# Patient Record
Sex: FEMALE | Race: WHITE | Employment: FULL TIME | URBAN - METROPOLITAN AREA
[De-identification: names, ages, dates, MRNs, and addresses within clinical notes are randomized per-mention and may not be internally consistent; named-entity substitution may affect disease eponyms.]

---

## 2019-09-09 RX ORDER — PAROXETINE HYDROCHLORIDE 20 MG/1
TABLET, FILM COATED ORAL
Qty: 30 TABLET | Refills: 0 | Status: SHIPPED | OUTPATIENT
Start: 2019-09-09 | End: 2019-09-23 | Stop reason: SDUPTHER

## 2019-09-09 RX ORDER — PAROXETINE 10 MG/1
TABLET, FILM COATED ORAL
Qty: 30 TABLET | Refills: 0 | Status: SHIPPED | OUTPATIENT
Start: 2019-09-09 | End: 2019-09-23 | Stop reason: SDUPTHER

## 2019-09-09 RX ORDER — TRAZODONE HYDROCHLORIDE 50 MG/1
TABLET ORAL
Qty: 30 TABLET | Refills: 0 | Status: SHIPPED | OUTPATIENT
Start: 2019-09-09 | End: 2019-09-23 | Stop reason: SDUPTHER

## 2019-09-11 DIAGNOSIS — F41.9 ANXIETY: ICD-10-CM

## 2019-09-11 DIAGNOSIS — E66.09 EXOGENOUS OBESITY: ICD-10-CM

## 2019-09-12 ENCOUNTER — TELEPHONE (OUTPATIENT)
Dept: FAMILY MEDICINE CLINIC | Age: 39
End: 2019-09-12

## 2019-09-23 ENCOUNTER — OFFICE VISIT (OUTPATIENT)
Dept: FAMILY MEDICINE CLINIC | Age: 39
End: 2019-09-23
Payer: COMMERCIAL

## 2019-09-23 VITALS
SYSTOLIC BLOOD PRESSURE: 124 MMHG | WEIGHT: 257 LBS | HEIGHT: 64 IN | OXYGEN SATURATION: 97 % | DIASTOLIC BLOOD PRESSURE: 74 MMHG | BODY MASS INDEX: 43.87 KG/M2 | HEART RATE: 88 BPM

## 2019-09-23 DIAGNOSIS — G47.00 INSOMNIA, UNSPECIFIED TYPE: Primary | ICD-10-CM

## 2019-09-23 DIAGNOSIS — Z23 NEED FOR PROPHYLACTIC VACCINATION AGAINST DIPHTHERIA-TETANUS-PERTUSSIS (DTP): ICD-10-CM

## 2019-09-23 DIAGNOSIS — F41.9 ANXIETY: ICD-10-CM

## 2019-09-23 DIAGNOSIS — E78.2 MIXED HYPERLIPIDEMIA: ICD-10-CM

## 2019-09-23 LAB
A/G RATIO: 1.2 (ref 1.1–2.2)
ALBUMIN SERPL-MCNC: 3.8 G/DL (ref 3.4–5)
ALP BLD-CCNC: 52 U/L (ref 40–129)
ALT SERPL-CCNC: 12 U/L (ref 10–40)
ANION GAP SERPL CALCULATED.3IONS-SCNC: 14 MMOL/L (ref 3–16)
AST SERPL-CCNC: 12 U/L (ref 15–37)
BASOPHILS ABSOLUTE: 0 K/UL (ref 0–0.2)
BASOPHILS RELATIVE PERCENT: 0.3 %
BILIRUB SERPL-MCNC: 0.4 MG/DL (ref 0–1)
BUN BLDV-MCNC: 10 MG/DL (ref 7–20)
CALCIUM SERPL-MCNC: 9 MG/DL (ref 8.3–10.6)
CHLORIDE BLD-SCNC: 101 MMOL/L (ref 99–110)
CHOLESTEROL, TOTAL: 205 MG/DL (ref 0–199)
CO2: 23 MMOL/L (ref 21–32)
CREAT SERPL-MCNC: 0.8 MG/DL (ref 0.6–1.1)
EOSINOPHILS ABSOLUTE: 0.1 K/UL (ref 0–0.6)
EOSINOPHILS RELATIVE PERCENT: 0.7 %
GFR AFRICAN AMERICAN: >60
GFR NON-AFRICAN AMERICAN: >60
GLOBULIN: 3.1 G/DL
GLUCOSE BLD-MCNC: 96 MG/DL (ref 70–99)
HCT VFR BLD CALC: 41.3 % (ref 36–48)
HDLC SERPL-MCNC: 89 MG/DL (ref 40–60)
HEMOGLOBIN: 13.7 G/DL (ref 12–16)
LDL CHOLESTEROL CALCULATED: 75 MG/DL
LYMPHOCYTES ABSOLUTE: 4 K/UL (ref 1–5.1)
LYMPHOCYTES RELATIVE PERCENT: 45.3 %
MCH RBC QN AUTO: 29.3 PG (ref 26–34)
MCHC RBC AUTO-ENTMCNC: 33.2 G/DL (ref 31–36)
MCV RBC AUTO: 88.2 FL (ref 80–100)
MONOCYTES ABSOLUTE: 0.6 K/UL (ref 0–1.3)
MONOCYTES RELATIVE PERCENT: 6.9 %
NEUTROPHILS ABSOLUTE: 4.1 K/UL (ref 1.7–7.7)
NEUTROPHILS RELATIVE PERCENT: 46.8 %
PDW BLD-RTO: 13.8 % (ref 12.4–15.4)
PLATELET # BLD: 360 K/UL (ref 135–450)
PMV BLD AUTO: 7.4 FL (ref 5–10.5)
POTASSIUM SERPL-SCNC: 4.1 MMOL/L (ref 3.5–5.1)
RBC # BLD: 4.69 M/UL (ref 4–5.2)
SODIUM BLD-SCNC: 138 MMOL/L (ref 136–145)
TOTAL PROTEIN: 6.9 G/DL (ref 6.4–8.2)
TRIGL SERPL-MCNC: 205 MG/DL (ref 0–150)
VLDLC SERPL CALC-MCNC: 41 MG/DL
WBC # BLD: 8.8 K/UL (ref 4–11)

## 2019-09-23 PROCEDURE — 99213 OFFICE O/P EST LOW 20 MIN: CPT | Performed by: PHYSICIAN ASSISTANT

## 2019-09-23 PROCEDURE — 90715 TDAP VACCINE 7 YRS/> IM: CPT | Performed by: PHYSICIAN ASSISTANT

## 2019-09-23 PROCEDURE — 90471 IMMUNIZATION ADMIN: CPT | Performed by: PHYSICIAN ASSISTANT

## 2019-09-23 RX ORDER — PAROXETINE 10 MG/1
10 TABLET, FILM COATED ORAL EVERY MORNING
Qty: 90 TABLET | Refills: 1 | Status: SHIPPED | OUTPATIENT
Start: 2019-09-23 | End: 2020-03-23 | Stop reason: SDUPTHER

## 2019-09-23 RX ORDER — PAROXETINE HYDROCHLORIDE 20 MG/1
20 TABLET, FILM COATED ORAL DAILY
Qty: 90 TABLET | Refills: 1 | Status: SHIPPED | OUTPATIENT
Start: 2019-09-23 | End: 2020-03-23 | Stop reason: SDUPTHER

## 2019-09-23 RX ORDER — TRAZODONE HYDROCHLORIDE 50 MG/1
50 TABLET ORAL NIGHTLY
Qty: 90 TABLET | Refills: 1 | Status: SHIPPED | OUTPATIENT
Start: 2019-09-23 | End: 2020-03-23 | Stop reason: SDUPTHER

## 2019-09-23 ASSESSMENT — PATIENT HEALTH QUESTIONNAIRE - PHQ9
2. FEELING DOWN, DEPRESSED OR HOPELESS: 0
SUM OF ALL RESPONSES TO PHQ QUESTIONS 1-9: 1
1. LITTLE INTEREST OR PLEASURE IN DOING THINGS: 1
SUM OF ALL RESPONSES TO PHQ9 QUESTIONS 1 & 2: 1
SUM OF ALL RESPONSES TO PHQ QUESTIONS 1-9: 1

## 2019-09-23 NOTE — PROGRESS NOTES
Smoker    Smokeless tobacco: Never Used   Substance and Sexual Activity    Alcohol use: No    Drug use: No    Sexual activity: Yes   Lifestyle    Physical activity:     Days per week: None     Minutes per session: None    Stress: None   Relationships    Social connections:     Talks on phone: None     Gets together: None     Attends Mandaen service: None     Active member of club or organization: None     Attends meetings of clubs or organizations: None     Relationship status: None    Intimate partner violence:     Fear of current or ex partner: None     Emotionally abused: None     Physically abused: None     Forced sexual activity: None   Other Topics Concern    None   Social History Narrative    None        SURGICAL HISTORY  Past Surgical History:   Procedure Laterality Date    CHOLECYSTECTOMY, LAPAROSCOPIC  2013    Dr Justyna Jarquin, 1993       CURRENT MEDICATIONS  Current Outpatient Medications   Medication Sig Dispense Refill    PARoxetine (PAXIL) 10 MG tablet Take 1 tablet by mouth every morning 90 tablet 1    PARoxetine (PAXIL) 20 MG tablet Take 1 tablet by mouth daily 90 tablet 1    traZODone (DESYREL) 50 MG tablet Take 1 tablet by mouth nightly 90 tablet 1    drospirenone-ethinyl estradiol (QUEENIE 28) 3-0.03 MG TABS Take 1 tablet by mouth daily. No current facility-administered medications for this visit.         ALLERGIES  No Known Allergies    PHYSICAL EXAM    /74 (Site: Left Upper Arm, Position: Sitting, Cuff Size: Large Adult)   Pulse 88   Ht 5' 3.5\" (1.613 m)   Wt 257 lb (116.6 kg)   SpO2 97% Comment: RA  BMI 44.81 kg/m²     Constitutional:  Well developed, well nourished  HENT:  Normocephalic, atraumatic, bilateral external ears normal, oropharynx moist, nose normal  Eyes:  PERRLA, EOMI, conjunctiva normal, no discharge, no scleral icterus  Neck:  Normal range of motion, no tenderness, supple, no thyromegaly   Lymphatic:  No lymphadenopathy care reviewed with patient who verbalizes understanding and wishes to continue. Patient verbalizes understanding with the above plan and is in agreement. Patient will call with  worsening of symptoms, questions or concerns. Please note that this chart was generated using dragon dictation software. Although every effort was made to ensure the accuracy of this automated transcription, some errors in transcription may have occurred.     Electronically signed by Jl Mcgowan PA-C on 9/23/2019

## 2020-03-23 ENCOUNTER — OFFICE VISIT (OUTPATIENT)
Dept: FAMILY MEDICINE CLINIC | Age: 40
End: 2020-03-23
Payer: COMMERCIAL

## 2020-03-23 VITALS
OXYGEN SATURATION: 97 % | WEIGHT: 261 LBS | BODY MASS INDEX: 44.56 KG/M2 | SYSTOLIC BLOOD PRESSURE: 116 MMHG | HEIGHT: 64 IN | HEART RATE: 97 BPM | DIASTOLIC BLOOD PRESSURE: 80 MMHG

## 2020-03-23 PROCEDURE — 99213 OFFICE O/P EST LOW 20 MIN: CPT | Performed by: FAMILY MEDICINE

## 2020-03-23 RX ORDER — PAROXETINE 10 MG/1
10 TABLET, FILM COATED ORAL EVERY MORNING
Qty: 90 TABLET | Refills: 1 | Status: SHIPPED | OUTPATIENT
Start: 2020-03-23 | End: 2020-09-15

## 2020-03-23 RX ORDER — PAROXETINE HYDROCHLORIDE 20 MG/1
20 TABLET, FILM COATED ORAL DAILY
Qty: 90 TABLET | Refills: 1 | Status: SHIPPED | OUTPATIENT
Start: 2020-03-23 | End: 2020-09-15

## 2020-03-23 RX ORDER — TRAZODONE HYDROCHLORIDE 50 MG/1
50 TABLET ORAL NIGHTLY
Qty: 90 TABLET | Refills: 1 | Status: SHIPPED | OUTPATIENT
Start: 2020-03-23 | End: 2020-09-15

## 2020-03-23 ASSESSMENT — ENCOUNTER SYMPTOMS
DIARRHEA: 0
VOMITING: 0
EYE PAIN: 0
ABDOMINAL PAIN: 0
CHEST TIGHTNESS: 0
BLOOD IN STOOL: 0
WHEEZING: 0
NAUSEA: 0
SHORTNESS OF BREATH: 0
TROUBLE SWALLOWING: 0

## 2020-03-23 NOTE — PROGRESS NOTES
3/23/2020    Lor Dash    Chief Complaint   Patient presents with    6 Month Follow-Up     \"at the tail end of sinus infection\"  Thinks might have UTI. HPI  History was obtained from patient. Magdalene Maradiaga is a 46 y.o. female who presents today with routine recheck on anxiety and chronic insomnia. Patient is actually doing quite well. Meds are well-tolerated she is resting okay at night. Patient recently over some allergy symptoms -no history of fever. Mood is actually quite pleasant and nonanxious today. She currently is working from home and so far it is working out well for her. She is giving some symptoms of dysuria, but no blood in no severe pain/fever. Urinalysis today was essentially negative. On review she is due for refills discussion/ encouragement was carried out. REVIEW OF SYMPTOMS    Review of Systems   Constitutional: Negative for activity change and fatigue. HENT: Negative for congestion, hearing loss, mouth sores and trouble swallowing. Eyes: Negative for pain and visual disturbance. Respiratory: Negative for chest tightness, shortness of breath and wheezing. Cardiovascular: Negative for chest pain and palpitations. Gastrointestinal: Negative for abdominal pain, blood in stool, diarrhea, nausea and vomiting. Endocrine: Negative for polydipsia and polyuria. Genitourinary: Negative for dysuria, frequency and urgency. Musculoskeletal: Negative for arthralgias, gait problem and neck stiffness. Skin: Negative for rash. Allergic/Immunologic: Negative for environmental allergies. Neurological: Negative for dizziness, seizures, speech difficulty and weakness. Hematological: Does not bruise/bleed easily. Psychiatric/Behavioral: Positive for sleep disturbance. Negative for agitation, confusion and hallucinations. The patient is nervous/anxious.         PAST MEDICAL HISTORY  Past Medical History:   Diagnosis Date    Anxiety     Chronic low back pain     Take 1 tablet by mouth nightly 90 tablet 1    drospirenone-ethinyl estradiol (QUEENIE 28) 3-0.03 MG TABS Take 1 tablet by mouth daily. No current facility-administered medications for this visit. ALLERGIES  No Known Allergies    PHYSICAL EXAM    /80 (Site: Left Upper Arm, Position: Sitting, Cuff Size: Large Adult)   Pulse 97   Ht 5' 3.5\" (1.613 m)   Wt 261 lb (118.4 kg)   SpO2 97%   BMI 45.51 kg/m²     Physical Exam  Vitals signs and nursing note reviewed. Constitutional:       General: She is not in acute distress. Appearance: She is well-developed. She is obese. She is not ill-appearing. HENT:      Head: Normocephalic and atraumatic. Mouth/Throat:      Mouth: Mucous membranes are moist.   Eyes:      Pupils: Pupils are equal, round, and reactive to light. Neck:      Musculoskeletal: Normal range of motion and neck supple. Cardiovascular:      Rate and Rhythm: Normal rate and regular rhythm. Heart sounds: Normal heart sounds. Pulmonary:      Effort: Pulmonary effort is normal.      Breath sounds: Normal breath sounds. Abdominal:      Palpations: Abdomen is soft. Musculoskeletal: Normal range of motion. Skin:     General: Skin is warm and dry. Neurological:      Mental Status: She is alert and oriented to person, place, and time. Psychiatric:         Thought Content: Thought content normal.         ASSESSMENT & PLAN     Diagnosis Orders   1. Anxiety  PARoxetine (PAXIL) 10 MG tablet    PARoxetine (PAXIL) 20 MG tablet   2. Insomnia, unspecified type  traZODone (DESYREL) 50 MG tablet   3. Dysuria     At this point refills be provided. The patient encouraged for healthy lifestyle. She is to push fluids and avoid caffeine- may use over-the-counter urinary analgesic as needed. Continue to work on exercise and weight loss. Return in about 6 months (around 9/23/2020).          Electronically signed by Mahendra Bear MD on 3/23/2020

## 2020-04-15 ENCOUNTER — TELEPHONE (OUTPATIENT)
Dept: FAMILY MEDICINE CLINIC | Age: 40
End: 2020-04-15

## 2020-04-15 RX ORDER — SULFAMETHOXAZOLE AND TRIMETHOPRIM 800; 160 MG/1; MG/1
1 TABLET ORAL 2 TIMES DAILY
Qty: 10 TABLET | Refills: 0 | Status: SHIPPED | OUTPATIENT
Start: 2020-04-15 | End: 2020-04-20

## 2020-04-15 NOTE — TELEPHONE ENCOUNTER
Have her push fluids avoid caffeine. We will empirically treat her with Bactrim DS 1 p.o. twice daily for 5 days.   May use over-the-counter Azo for symptomatic bladder discomfort

## 2020-04-15 NOTE — TELEPHONE ENCOUNTER
Pt is having frequent need to use the bathroom with very little actually coming out-- was tested for UTI when she was here in March and it was negative-- Is worried about having some sort of infections-- she self treated for yest but is still having the frequency

## 2020-09-15 RX ORDER — PAROXETINE 10 MG/1
TABLET, FILM COATED ORAL
Qty: 30 TABLET | Refills: 0 | Status: SHIPPED | OUTPATIENT
Start: 2020-09-15 | End: 2020-09-28 | Stop reason: SDUPTHER

## 2020-09-15 RX ORDER — PAROXETINE HYDROCHLORIDE 20 MG/1
TABLET, FILM COATED ORAL
Qty: 30 TABLET | Refills: 0 | Status: SHIPPED | OUTPATIENT
Start: 2020-09-15 | End: 2020-09-28 | Stop reason: SDUPTHER

## 2020-09-15 RX ORDER — TRAZODONE HYDROCHLORIDE 50 MG/1
TABLET ORAL
Qty: 30 TABLET | Refills: 0 | Status: SHIPPED | OUTPATIENT
Start: 2020-09-15 | End: 2020-09-28 | Stop reason: SDUPTHER

## 2020-09-15 NOTE — TELEPHONE ENCOUNTER
Requested Prescriptions     Signed Prescriptions Disp Refills    traZODone (DESYREL) 50 MG tablet 30 tablet 0     Sig: TAKE 1 TABLET BY MOUTH EVERY DAY AT NIGHT     Authorizing Provider: Olive Baum     Ordering User: AB Kurtz    PARoxetine (PAXIL) 20 MG tablet 30 tablet 0     Sig: TAKE 1 TABLET BY MOUTH EVERY DAY     Authorizing Provider: Olive Baum     Ordering User: AB PEREZ    PARoxetine (PAXIL) 10 MG tablet 30 tablet 0     Sig: TAKE 1 TABLET BY MOUTH EVERY DAY IN THE MORNING     Authorizing Provider: Olive Baum     Ordering User: Cami Yung

## 2020-09-23 RX ORDER — PAROXETINE HYDROCHLORIDE 20 MG/1
TABLET, FILM COATED ORAL
Qty: 30 TABLET | Refills: 0 | OUTPATIENT
Start: 2020-09-23 | End: 2020-10-23

## 2020-09-23 RX ORDER — PAROXETINE 10 MG/1
TABLET, FILM COATED ORAL
Qty: 30 TABLET | Refills: 0 | OUTPATIENT
Start: 2020-09-23 | End: 2020-10-23

## 2020-09-23 RX ORDER — TRAZODONE HYDROCHLORIDE 50 MG/1
TABLET ORAL
Qty: 30 TABLET | Refills: 0 | OUTPATIENT
Start: 2020-09-23 | End: 2020-10-23

## 2020-09-28 ENCOUNTER — TELEMEDICINE (OUTPATIENT)
Dept: FAMILY MEDICINE CLINIC | Age: 40
End: 2020-09-28
Payer: COMMERCIAL

## 2020-09-28 PROCEDURE — 99213 OFFICE O/P EST LOW 20 MIN: CPT | Performed by: FAMILY MEDICINE

## 2020-09-28 RX ORDER — PAROXETINE 10 MG/1
TABLET, FILM COATED ORAL
Qty: 90 TABLET | Refills: 1 | Status: SHIPPED | OUTPATIENT
Start: 2020-09-28 | End: 2021-03-22

## 2020-09-28 RX ORDER — PAROXETINE HYDROCHLORIDE 20 MG/1
TABLET, FILM COATED ORAL
Qty: 90 TABLET | Refills: 1 | Status: SHIPPED | OUTPATIENT
Start: 2020-09-28 | End: 2021-03-22

## 2020-09-28 RX ORDER — TRAZODONE HYDROCHLORIDE 50 MG/1
TABLET ORAL
Qty: 90 TABLET | Refills: 1 | Status: SHIPPED | OUTPATIENT
Start: 2020-09-28 | End: 2021-03-22

## 2020-09-28 ASSESSMENT — ENCOUNTER SYMPTOMS
TROUBLE SWALLOWING: 0
CHEST TIGHTNESS: 0
SHORTNESS OF BREATH: 0
EYE PAIN: 0
WHEEZING: 0
ABDOMINAL PAIN: 0
BLOOD IN STOOL: 0
DIARRHEA: 0
VOMITING: 0
NAUSEA: 0

## 2020-09-28 ASSESSMENT — PATIENT HEALTH QUESTIONNAIRE - PHQ9
SUM OF ALL RESPONSES TO PHQ QUESTIONS 1-9: 0
1. LITTLE INTEREST OR PLEASURE IN DOING THINGS: 0
SUM OF ALL RESPONSES TO PHQ QUESTIONS 1-9: 0
SUM OF ALL RESPONSES TO PHQ9 QUESTIONS 1 & 2: 0
2. FEELING DOWN, DEPRESSED OR HOPELESS: 0

## 2020-09-28 NOTE — PROGRESS NOTES
2020    TELEHEALTH EVALUATION -- Audio/Visual (During [facility]-48 public health emergency)    HPI:    Abisai Odonnell (:  1980) has requested an audio/video evaluation for the following concern(s): Anxiety and insomnia. Does have ongoing issues with increased weight also BMI 45.5. Patient states she is now working from home and she is going to accept that and actually enjoy it. Does have some other issues with stress from work overall is been doing well the Paxil has done a fairly good job for anxiety attacks and does well does help with sleep. She ran out of her Paxil and had some withdrawal side effects recently. Denies other acute changes or problems labs were about a year ago they were reasonable. Today mood seems good denies other acute issues. Did discuss the fact that she needs to continue to work on increasing exercise and weight loss. On review she will need refills and was advised to get flu shot along with rest of her family. Review of Systems   Constitutional: Negative for activity change and fatigue. HENT: Negative for congestion, hearing loss, mouth sores and trouble swallowing. Eyes: Negative for pain and visual disturbance. Respiratory: Negative for chest tightness, shortness of breath and wheezing. Cardiovascular: Negative for chest pain and palpitations. Gastrointestinal: Negative for abdominal pain, blood in stool, diarrhea, nausea and vomiting. Endocrine: Negative for polydipsia and polyuria. Genitourinary: Negative for dysuria, frequency and urgency. Musculoskeletal: Negative for arthralgias, gait problem and neck stiffness. Skin: Negative for rash. Allergic/Immunologic: Negative for environmental allergies. Neurological: Negative for dizziness, seizures, speech difficulty and weakness. Hematological: Does not bruise/bleed easily. Psychiatric/Behavioral: Positive for sleep disturbance. Negative for agitation, confusion and hallucinations.  The patient is nervous/anxious. Prior to Visit Medications    Medication Sig Taking? Authorizing Provider   PARoxetine (PAXIL) 10 MG tablet TAKE 1 TABLET BY MOUTH EVERY DAY IN THE MORNING Yes Papito Gardner MD   PARoxetine (PAXIL) 20 MG tablet TAKE 1 TABLET BY MOUTH EVERY DAY Yes Papito Gardner MD   traZODone (DESYREL) 50 MG tablet TAKE 1 TABLET BY MOUTH EVERY DAY AT NIGHT Yes Papito Gardner MD   drospirenone-ethinyl estradiol (QUEENIE 28) 3-0.03 MG TABS Take 1 tablet by mouth daily. Yes Historical Provider, MD       Social History     Tobacco Use    Smoking status: Never Smoker    Smokeless tobacco: Never Used   Substance Use Topics    Alcohol use: No    Drug use: No            PHYSICAL EXAMINATION:  [ INSTRUCTIONS:  \"[x]\" Indicates a positive item  \"[]\" Indicates a negative item  -- DELETE ALL ITEMS NOT EXAMINED]  Vital Signs: (As obtained by patient/caregiver or practitioner observation)    Blood pressure-  Heart rate-    Respiratory rate-    Temperature-  Pulse oximetry-     Constitutional: [x] Appears well-developed and well-nourished [x] No apparent distress      [] Abnormal-   Mental status  [x] Alert and awake  [x] Oriented to person/place/time [x]Able to follow commands      Eyes:  EOM    [x]  Normal  [] Abnormal-  Sclera  [x]  Normal  [] Abnormal -         Discharge []  None visible  [] Abnormal -    HENT:   [x] Normocephalic, atraumatic.   [] Abnormal   [x] Mouth/Throat: Mucous membranes are moist.     External Ears [] Normal  [] Abnormal-     Neck: [x] No visualized mass     Pulmonary/Chest: [x] Respiratory effort normal.  [x] No visualized signs of difficulty breathing or respiratory distress        [] Abnormal-      Musculoskeletal:   [] Normal gait with no signs of ataxia         [x] Normal range of motion of neck        [] Abnormal-       Neurological:        [x] No Facial Asymmetry (Cranial nerve 7 motor function) (limited exam to video visit)          [x] No gaze palsy [] Abnormal-         Skin:        [x] No significant exanthematous lesions or discoloration noted on facial skin         [] Abnormal-            Psychiatric:       [x] Normal Affect [] No Hallucinations        [] Abnormal-     Other pertinent observable physical exam findings-     ASSESSMENT/PLAN:  1. Anxiety    - PARoxetine (PAXIL) 10 MG tablet; TAKE 1 TABLET BY MOUTH EVERY DAY IN THE MORNING  Dispense: 90 tablet; Refill: 1  - PARoxetine (PAXIL) 20 MG tablet; TAKE 1 TABLET BY MOUTH EVERY DAY  Dispense: 90 tablet; Refill: 1    2. Insomnia, unspecified type    - traZODone (DESYREL) 50 MG tablet; TAKE 1 TABLET BY MOUTH EVERY DAY AT NIGHT  Dispense: 90 tablet; Refill: 1    3. Exogenous obesity  At this point questions answered encouragement given. Continue with healthy lifestyle, weight loss, exercise, and social distancing. Will provide refills as needed. She was advised to get a flu shot. He is to call with questions or problems. Return in about 6 months (around 3/28/2021). Jewel Li is a 46 y.o. female being evaluated by a Virtual Visit (video visit) encounter to address concerns as mentioned above. A caregiver was present when appropriate. Due to this being a TeleHealth encounter (During [facility] public health emergency), evaluation of the following organ systems was limited: Vitals/Constitutional/EENT/Resp/CV/GI//MS/Neuro/Skin/Heme-Lymph-Imm. Pursuant to the emergency declaration under the [address] [place] [address] and the [employer] and [employer] Act, this Virtual Visit was conducted with patient's (and/or legal guardian's) consent, to reduce the patient's risk of exposure to COVID-19 and provide necessary medical care.   The patient (and/or legal guardian) has also been advised to contact this office for worsening conditions or problems, and seek emergency medical treatment and/or call 911 if deemed necessary. Patient identification was verified at the start of the visit: Yes    Total time spent on this encounter: 15 minutes    Services were provided through a video synchronous discussion virtually to substitute for in-person clinic visit. Patient and provider were located at their individual homes. --Morena Andujar MD on 9/28/2020 at 12:27 PM    An electronic signature was used to authenticate this note.

## 2021-03-21 DIAGNOSIS — F41.9 ANXIETY: ICD-10-CM

## 2021-03-21 DIAGNOSIS — G47.00 INSOMNIA, UNSPECIFIED TYPE: ICD-10-CM

## 2021-03-22 RX ORDER — TRAZODONE HYDROCHLORIDE 50 MG/1
TABLET ORAL
Qty: 90 TABLET | Refills: 1 | Status: SHIPPED | OUTPATIENT
Start: 2021-03-22 | End: 2021-09-16

## 2021-03-22 RX ORDER — PAROXETINE HYDROCHLORIDE 20 MG/1
TABLET, FILM COATED ORAL
Qty: 90 TABLET | Refills: 1 | Status: SHIPPED | OUTPATIENT
Start: 2021-03-22 | End: 2021-09-16

## 2021-03-22 RX ORDER — PAROXETINE 10 MG/1
TABLET, FILM COATED ORAL
Qty: 90 TABLET | Refills: 1 | Status: SHIPPED | OUTPATIENT
Start: 2021-03-22 | End: 2021-09-16

## 2021-03-29 ENCOUNTER — OFFICE VISIT (OUTPATIENT)
Dept: FAMILY MEDICINE CLINIC | Age: 41
End: 2021-03-29
Payer: COMMERCIAL

## 2021-03-29 VITALS
WEIGHT: 256 LBS | BODY MASS INDEX: 43.71 KG/M2 | HEART RATE: 88 BPM | SYSTOLIC BLOOD PRESSURE: 116 MMHG | DIASTOLIC BLOOD PRESSURE: 76 MMHG | TEMPERATURE: 97 F | OXYGEN SATURATION: 97 % | HEIGHT: 64 IN

## 2021-03-29 DIAGNOSIS — Z13.29 THYROID DISORDER SCREEN: ICD-10-CM

## 2021-03-29 DIAGNOSIS — G47.00 INSOMNIA, UNSPECIFIED TYPE: ICD-10-CM

## 2021-03-29 DIAGNOSIS — E78.2 MIXED HYPERLIPIDEMIA: ICD-10-CM

## 2021-03-29 DIAGNOSIS — F41.9 ANXIETY: Primary | ICD-10-CM

## 2021-03-29 LAB
A/G RATIO: 1.1 (ref 1.1–2.2)
ALBUMIN SERPL-MCNC: 3.7 G/DL (ref 3.4–5)
ALP BLD-CCNC: 55 U/L (ref 40–129)
ALT SERPL-CCNC: 8 U/L (ref 10–40)
ANION GAP SERPL CALCULATED.3IONS-SCNC: 12 MMOL/L (ref 3–16)
AST SERPL-CCNC: 10 U/L (ref 15–37)
BILIRUB SERPL-MCNC: 0.3 MG/DL (ref 0–1)
BUN BLDV-MCNC: 9 MG/DL (ref 7–20)
CALCIUM SERPL-MCNC: 9 MG/DL (ref 8.3–10.6)
CHLORIDE BLD-SCNC: 106 MMOL/L (ref 99–110)
CHOLESTEROL, TOTAL: 217 MG/DL (ref 0–199)
CO2: 23 MMOL/L (ref 21–32)
CREAT SERPL-MCNC: 0.8 MG/DL (ref 0.6–1.1)
GFR AFRICAN AMERICAN: >60
GFR NON-AFRICAN AMERICAN: >60
GLOBULIN: 3.3 G/DL
GLUCOSE BLD-MCNC: 84 MG/DL (ref 70–99)
HCT VFR BLD CALC: 39.1 % (ref 36–48)
HDLC SERPL-MCNC: 70 MG/DL (ref 40–60)
HEMOGLOBIN: 13.2 G/DL (ref 12–16)
LDL CHOLESTEROL CALCULATED: 100 MG/DL
MCH RBC QN AUTO: 29.5 PG (ref 26–34)
MCHC RBC AUTO-ENTMCNC: 33.9 G/DL (ref 31–36)
MCV RBC AUTO: 86.9 FL (ref 80–100)
PDW BLD-RTO: 13.6 % (ref 12.4–15.4)
PLATELET # BLD: 359 K/UL (ref 135–450)
PMV BLD AUTO: 7.5 FL (ref 5–10.5)
POTASSIUM SERPL-SCNC: 4.3 MMOL/L (ref 3.5–5.1)
RBC # BLD: 4.49 M/UL (ref 4–5.2)
SODIUM BLD-SCNC: 141 MMOL/L (ref 136–145)
TOTAL PROTEIN: 7 G/DL (ref 6.4–8.2)
TRIGL SERPL-MCNC: 234 MG/DL (ref 0–150)
TSH REFLEX: 1.18 UIU/ML (ref 0.27–4.2)
VLDLC SERPL CALC-MCNC: 47 MG/DL
WBC # BLD: 7.1 K/UL (ref 4–11)

## 2021-03-29 PROCEDURE — 99213 OFFICE O/P EST LOW 20 MIN: CPT | Performed by: FAMILY MEDICINE

## 2021-03-29 ASSESSMENT — PATIENT HEALTH QUESTIONNAIRE - PHQ9
SUM OF ALL RESPONSES TO PHQ QUESTIONS 1-9: 2
SUM OF ALL RESPONSES TO PHQ9 QUESTIONS 1 & 2: 2
1. LITTLE INTEREST OR PLEASURE IN DOING THINGS: 1
2. FEELING DOWN, DEPRESSED OR HOPELESS: 1
SUM OF ALL RESPONSES TO PHQ QUESTIONS 1-9: 2

## 2021-03-29 ASSESSMENT — ENCOUNTER SYMPTOMS
VOMITING: 0
CHEST TIGHTNESS: 0
ABDOMINAL PAIN: 0
EYE PAIN: 0
SHORTNESS OF BREATH: 0
TROUBLE SWALLOWING: 0
DIARRHEA: 0
BLOOD IN STOOL: 0
WHEEZING: 0
NAUSEA: 0

## 2021-03-29 NOTE — PROGRESS NOTES
3/29/2021    Celiasa Dominguez Hardy    Chief Complaint   Patient presents with    6 Month Follow-Up     has an appt tomorrow with ortho for knee, galluch. HPI  History was obtained from the patient. Charl Holstein is a 45 y.o. female who presents today with follow-up on anxiety, insomnia, GERD, and increased weight. She continues to work from home which has been very helpful for her stress levels. Mood remains fairly positive she is took a week off to be with her daughter over spring break. Labs in September 2019 were reasonable she is due for more labs at this point. Has not had Covid vaccine and we discussed the importance of her getting that now that it is down to her age group. Tamar Long REVIEW OF SYMPTOMS    Review of Systems   Constitutional: Negative for activity change and fatigue. HENT: Negative for congestion, hearing loss, mouth sores and trouble swallowing. Eyes: Negative for pain and visual disturbance. Respiratory: Negative for chest tightness, shortness of breath and wheezing. Cardiovascular: Negative for chest pain and palpitations. Gastrointestinal: Negative for abdominal pain, blood in stool, diarrhea, nausea and vomiting. Endocrine: Negative for cold intolerance, polydipsia and polyuria. Genitourinary: Negative for dysuria, frequency and urgency. Musculoskeletal: Negative for arthralgias, gait problem and neck stiffness. Skin: Negative for rash. Allergic/Immunologic: Negative for environmental allergies. Neurological: Negative for dizziness, seizures, speech difficulty and weakness. Hematological: Does not bruise/bleed easily. Psychiatric/Behavioral: Positive for sleep disturbance. Negative for agitation, confusion and hallucinations. The patient is nervous/anxious.         PAST MEDICAL HISTORY  Past Medical History:   Diagnosis Date    Anxiety     Chronic low back pain     Exogenous obesity     Gall stones     GERD (gastroesophageal reflux disease)     Hyperlipidemia  Insomnia     Irritable bowel syndrome (IBS)        FAMILY HISTORY  Family History   Problem Relation Age of Onset    Seizures Mother     Sleep Apnea Mother     Hypertension Father    Collette Satchel Elevated Lipids Father        SOCIAL HISTORY  Social History     Socioeconomic History    Marital status:      Spouse name: None    Number of children: 1    Years of education: None    Highest education level: None   Occupational History    None   Social Needs    Financial resource strain: None    Food insecurity     Worry: None     Inability: None    Transportation needs     Medical: None     Non-medical: None   Tobacco Use    Smoking status: Never Smoker    Smokeless tobacco: Never Used   Substance and Sexual Activity    Alcohol use: No    Drug use: No    Sexual activity: Yes   Lifestyle    Physical activity     Days per week: None     Minutes per session: None    Stress: None   Relationships    Social connections     Talks on phone: None     Gets together: None     Attends Scientologist service: None     Active member of club or organization: None     Attends meetings of clubs or organizations: None     Relationship status: None    Intimate partner violence     Fear of current or ex partner: None     Emotionally abused: None     Physically abused: None     Forced sexual activity: None   Other Topics Concern    None   Social History Narrative    None        SURGICAL HISTORY  Past Surgical History:   Procedure Laterality Date    CHOLECYSTECTOMY, LAPAROSCOPIC  2013    Dr Betsy Heck, 1993                 CURRENT MEDICATIONS  Current Outpatient Medications   Medication Sig Dispense Refill    PARoxetine (PAXIL) 20 MG tablet TAKE 1 TABLET BY MOUTH EVERY DAY 90 tablet 1    PARoxetine (PAXIL) 10 MG tablet TAKE 1 TABLET BY MOUTH EVERY DAY IN THE MORNING 90 tablet 1    traZODone (DESYREL) 50 MG tablet TAKE 1 TABLET BY MOUTH EVERY DAY AT NIGHT 90 tablet 1    drospirenone-ethinyl estradiol (QUEENIE 28) 3-0.03 MG TABS Take 1 tablet by mouth daily. No current facility-administered medications for this visit. ALLERGIES  No Known Allergies    PHYSICAL EXAM    /76 (Site: Right Upper Arm, Position: Sitting, Cuff Size: Large Adult)   Pulse 88   Temp 97 °F (36.1 °C)   Ht 5' 3.5\" (1.613 m)   Wt 256 lb (116.1 kg)   SpO2 97%   BMI 44.64 kg/m²     Physical Exam  Vitals signs and nursing note reviewed. Constitutional:       General: She is not in acute distress. Appearance: She is well-developed. She is obese. She is not ill-appearing or toxic-appearing. HENT:      Head: Normocephalic and atraumatic. Nose: Nose normal.      Mouth/Throat:      Mouth: Mucous membranes are moist.   Eyes:      Pupils: Pupils are equal, round, and reactive to light. Neck:      Musculoskeletal: Normal range of motion and neck supple. Cardiovascular:      Rate and Rhythm: Normal rate and regular rhythm. Heart sounds: Normal heart sounds. No murmur. Pulmonary:      Effort: Pulmonary effort is normal.      Breath sounds: Rhonchi present. No wheezing or rales. Abdominal:      Palpations: Abdomen is soft. Musculoskeletal: Normal range of motion. General: No swelling or deformity. Skin:     General: Skin is warm and dry. Neurological:      General: No focal deficit present. Mental Status: She is alert and oriented to person, place, and time. Mental status is at baseline. Cranial Nerves: No cranial nerve deficit. Gait: Gait normal.   Psychiatric:         Mood and Affect: Mood normal.         Behavior: Behavior normal.         Thought Content: Thought content normal.         ASSESSMENT & PLAN     Diagnosis Orders   1. Anxiety     2. Insomnia, unspecified type  CBC    COMPREHENSIVE METABOLIC PANEL   3. Mixed hyperlipidemia  LIPID PANEL   4. Thyroid disorder screen  TSH with Reflex   Advised to get Covid shot.   And to work on increasing exercise and weight loss.  Will check CBC, CMP, TSH, and lipid panel and have her follow-up for all results. Questions answered and encouragement given. Refills to be provided as needed. Return in about 6 months (around 9/29/2021).          Electronically signed by Tia Recio MD on 3/29/2021

## 2021-09-16 DIAGNOSIS — G47.00 INSOMNIA, UNSPECIFIED TYPE: ICD-10-CM

## 2021-09-16 DIAGNOSIS — F41.9 ANXIETY: ICD-10-CM

## 2021-09-16 RX ORDER — PAROXETINE 10 MG/1
TABLET, FILM COATED ORAL
Qty: 90 TABLET | Refills: 1 | Status: SHIPPED | OUTPATIENT
Start: 2021-09-16 | End: 2022-04-18

## 2021-09-16 RX ORDER — TRAZODONE HYDROCHLORIDE 50 MG/1
TABLET ORAL
Qty: 90 TABLET | Refills: 1 | Status: SHIPPED | OUTPATIENT
Start: 2021-09-16 | End: 2022-03-29 | Stop reason: SDUPTHER

## 2021-09-16 RX ORDER — PAROXETINE HYDROCHLORIDE 20 MG/1
TABLET, FILM COATED ORAL
Qty: 90 TABLET | Refills: 1 | Status: SHIPPED | OUTPATIENT
Start: 2021-09-16 | End: 2022-04-18

## 2021-09-29 ENCOUNTER — TELEMEDICINE (OUTPATIENT)
Dept: FAMILY MEDICINE CLINIC | Age: 41
End: 2021-09-29
Payer: COMMERCIAL

## 2021-09-29 DIAGNOSIS — F41.9 ANXIETY: Primary | ICD-10-CM

## 2021-09-29 DIAGNOSIS — E66.09 EXOGENOUS OBESITY: ICD-10-CM

## 2021-09-29 DIAGNOSIS — G47.00 INSOMNIA, UNSPECIFIED TYPE: ICD-10-CM

## 2021-09-29 PROCEDURE — 99213 OFFICE O/P EST LOW 20 MIN: CPT | Performed by: FAMILY MEDICINE

## 2021-09-29 ASSESSMENT — ENCOUNTER SYMPTOMS
NAUSEA: 0
BACK PAIN: 1
EYE PAIN: 0
BLOOD IN STOOL: 0
DIARRHEA: 0
WHEEZING: 0
TROUBLE SWALLOWING: 0
VOMITING: 0
SHORTNESS OF BREATH: 0
CHEST TIGHTNESS: 0
ABDOMINAL PAIN: 0

## 2021-09-29 NOTE — PROGRESS NOTES
2021    TELEHEALTH EVALUATION -- Audio/Visual (During VLQOI-27 public health emergency)    HPI:    Cathleen Pacheco (:  1980) has requested an audio/video evaluation for the following concern(s): Anxiety, insomnia, low back pain, hyperlipidemia, and increased weight. Patient states she is Maryan Rasheed doing well work stresses been not controlled at this point. Mood is good labs in 2021 were satisfactory these included a CBC, CMP, TSH and lipid panel. Her insomnia is marginal work stress comes and goes but overall she is handling it better. Low back pain is unchanged. Still fighting weight issues. She has had her Covid shots x2. She and her  remain very involved in their daughter's softball career. Her medications are well-tolerated. Review of Systems   Constitutional: Negative for activity change and fatigue. HENT: Negative for congestion, hearing loss, mouth sores and trouble swallowing. Eyes: Negative for pain and visual disturbance. Respiratory: Negative for chest tightness, shortness of breath and wheezing. Cardiovascular: Negative for chest pain and palpitations. Gastrointestinal: Negative for abdominal pain, blood in stool, diarrhea, nausea and vomiting. Endocrine: Negative for polydipsia and polyuria. Genitourinary: Negative for dysuria, frequency and urgency. Musculoskeletal: Positive for back pain. Negative for arthralgias, gait problem and neck stiffness. Skin: Negative for rash. Allergic/Immunologic: Negative for environmental allergies. Neurological: Negative for dizziness, seizures, speech difficulty and weakness. Hematological: Does not bruise/bleed easily. Psychiatric/Behavioral: Positive for sleep disturbance. Negative for agitation, confusion and hallucinations. The patient is nervous/anxious. Prior to Visit Medications    Medication Sig Taking?  Authorizing Provider   PARoxetine (PAXIL) 10 MG tablet TAKE 1 TABLET BY MOUTH EVERY DAY Maru Morin Yes Destiney Amador MD   PARoxetine (PAXIL) 20 MG tablet TAKE 1 TABLET BY MOUTH EVERY DAY Yes Destiney Amador MD   traZODone (DESYREL) 50 MG tablet TAKE 1 TABLET BY MOUTH EVERY DAY AT NIGHT Yes Destiney Amador MD   drospirenone-ethinyl estradiol (QUEENIE 28) 3-0.03 MG TABS Take 1 tablet by mouth daily. Yes Historical Provider, MD       Social History     Tobacco Use    Smoking status: Never Smoker    Smokeless tobacco: Never Used   Substance Use Topics    Alcohol use: No    Drug use: No            PHYSICAL EXAMINATION:  [ INSTRUCTIONS:  \"[x]\" Indicates a positive item  \"[]\" Indicates a negative item  -- DELETE ALL ITEMS NOT EXAMINED]  Vital Signs: (As obtained by patient/caregiver or practitioner observation)    Blood pressure-  Heart rate-    Respiratory rate-    Temperature-  Pulse oximetry-     Constitutional: [x] Appears well-developed and well-nourished [x] No apparent distress      [] Abnormal-   Mental status  [x] Alert and awake  [x] Oriented to person/place/time [x]Able to follow commands      Eyes:  EOM    [x]  Normal  [] Abnormal-  Sclera  [x]  Normal  [] Abnormal -         Discharge []  None visible  [] Abnormal -    HENT:   [x] Normocephalic, atraumatic.   [] Abnormal   [x] Mouth/Throat: Mucous membranes are moist.     External Ears [] Normal  [] Abnormal-     Neck: [x] No visualized mass     Pulmonary/Chest: [x] Respiratory effort normal.  [x] No visualized signs of difficulty breathing or respiratory distress        [] Abnormal-      Musculoskeletal:   [x] Normal gait with no signs of ataxia         [x] Normal range of motion of neck        [] Abnormal-       Neurological:        [x] No Facial Asymmetry (Cranial nerve 7 motor function) (limited exam to video visit)          [x] No gaze palsy        [] Abnormal-         Skin:        [x] No significant exanthematous lesions or discoloration noted on facial skin         [] Abnormal-            Psychiatric:       [x] Normal Affect [] No Hallucinations        [] Abnormal-     Other pertinent observable physical exam findings-     ASSESSMENT/PLAN:  1. Anxiety      2. Insomnia, unspecified type  This point discussion carried out encouragement given she is continue to work on healthy lifestyle and mild weight loss. Increase exercise as possible advised to get flu shot as soon as possible will consider Covid booster when available to her age group. Will increase trazodone to 75 mg at bedtime and observe the effects on her insomnia. She is to call in 4- 5 weeks and let us know how she is doing= we will titrate treatment at that time. I would like to see her back in about 6 months-would repeat labs at that point. Call with questions or problems. Return in about 6 months (around 3/29/2022). Cindy Fisher, was evaluated through a synchronous (real-time) audio-video encounter. The patient (or guardian if applicable) is aware that this is a billable service. Verbal consent to proceed has been obtained within the past 12 months. The visit was conducted pursuant to the emergency declaration under the [address] [place] [address], [address] [place] [address] authority and the [employer] General Act. Patient identification was verified, and a caregiver was present when appropriate. The patient was located in a state where the provider was credentialed to provide care. Total time spent on this encounter: Not billed by time    --Cheko Batista MD on 9/29/2021 at 1:31 PM    An electronic signature was used to authenticate this note.

## 2022-03-16 DIAGNOSIS — G47.00 INSOMNIA, UNSPECIFIED TYPE: ICD-10-CM

## 2022-03-16 DIAGNOSIS — F41.9 ANXIETY: ICD-10-CM

## 2022-03-16 RX ORDER — TRAZODONE HYDROCHLORIDE 50 MG/1
TABLET ORAL
Qty: 90 TABLET | Refills: 1 | OUTPATIENT
Start: 2022-03-16

## 2022-03-16 RX ORDER — PAROXETINE HYDROCHLORIDE 20 MG/1
TABLET, FILM COATED ORAL
Qty: 90 TABLET | Refills: 1 | OUTPATIENT
Start: 2022-03-16

## 2022-03-16 RX ORDER — PAROXETINE 10 MG/1
TABLET, FILM COATED ORAL
Qty: 90 TABLET | Refills: 1 | OUTPATIENT
Start: 2022-03-16

## 2022-03-29 ENCOUNTER — OFFICE VISIT (OUTPATIENT)
Dept: FAMILY MEDICINE CLINIC | Age: 42
End: 2022-03-29
Payer: COMMERCIAL

## 2022-03-29 VITALS
WEIGHT: 254.2 LBS | SYSTOLIC BLOOD PRESSURE: 130 MMHG | BODY MASS INDEX: 43.4 KG/M2 | HEIGHT: 64 IN | HEART RATE: 90 BPM | OXYGEN SATURATION: 96 % | DIASTOLIC BLOOD PRESSURE: 84 MMHG

## 2022-03-29 DIAGNOSIS — F41.9 ANXIETY: ICD-10-CM

## 2022-03-29 DIAGNOSIS — E66.09 EXOGENOUS OBESITY: Primary | ICD-10-CM

## 2022-03-29 DIAGNOSIS — G47.00 INSOMNIA, UNSPECIFIED TYPE: ICD-10-CM

## 2022-03-29 PROCEDURE — 99213 OFFICE O/P EST LOW 20 MIN: CPT | Performed by: PHYSICIAN ASSISTANT

## 2022-03-29 RX ORDER — PAROXETINE 10 MG/1
TABLET, FILM COATED ORAL
Qty: 90 TABLET | Refills: 1 | Status: CANCELLED | OUTPATIENT
Start: 2022-03-29

## 2022-03-29 RX ORDER — PAROXETINE HYDROCHLORIDE 20 MG/1
TABLET, FILM COATED ORAL
Qty: 90 TABLET | Refills: 1 | Status: CANCELLED | OUTPATIENT
Start: 2022-03-29

## 2022-03-29 RX ORDER — PAROXETINE 30 MG/1
30 TABLET, FILM COATED ORAL DAILY
Qty: 30 TABLET | Refills: 3 | Status: SHIPPED | OUTPATIENT
Start: 2022-03-29 | End: 2022-04-25 | Stop reason: SDUPTHER

## 2022-03-29 RX ORDER — TRAZODONE HYDROCHLORIDE 150 MG/1
75 TABLET ORAL NIGHTLY
Qty: 45 TABLET | Refills: 1 | Status: SHIPPED | OUTPATIENT
Start: 2022-03-29 | End: 2022-09-29 | Stop reason: SDUPTHER

## 2022-03-29 NOTE — PROGRESS NOTES
 Sexual activity: Yes   Other Topics Concern    Not on file   Social History Narrative    Not on file     Social Determinants of Health     Financial Resource Strain:     Difficulty of Paying Living Expenses: Not on file   Food Insecurity:     Worried About Running Out of Food in the Last Year: Not on file    Jose of Food in the Last Year: Not on file   Transportation Needs:     Lack of Transportation (Medical): Not on file    Lack of Transportation (Non-Medical):  Not on file   Physical Activity:     Days of Exercise per Week: Not on file    Minutes of Exercise per Session: Not on file   Stress:     Feeling of Stress : Not on file   Social Connections:     Frequency of Communication with Friends and Family: Not on file    Frequency of Social Gatherings with Friends and Family: Not on file    Attends Jewish Services: Not on file    Active Member of Clubs or Organizations: Not on file    Attends Club or Organization Meetings: Not on file    Marital Status: Not on file   Intimate Partner Violence:     Fear of Current or Ex-Partner: Not on file    Emotionally Abused: Not on file    Physically Abused: Not on file    Sexually Abused: Not on file   Housing Stability:     Unable to Pay for Housing in the Last Year: Not on file    Number of [place] in the Last Year: Not on file    Unstable Housing in the Last Year: Not on file        SURGICAL HISTORY  Past Surgical History:   Procedure Laterality Date    CHOLECYSTECTOMY, LAPAROSCOPIC  2013    Dr Heather Cruz, 1993                 CURRENT MEDICATIONS  Current Outpatient Medications   Medication Sig Dispense Refill    traZODone (DESYREL) 150 MG tablet Take 0.5 tablets by mouth nightly 1 Tablet by mouth every night 45 tablet 1    PARoxetine (PAXIL) 30 MG tablet Take 1 tablet by mouth daily 30 tablet 3    PARoxetine (PAXIL) 10 MG tablet TAKE 1 TABLET BY MOUTH EVERY DAY IN THE MORNING 90 tablet 1    PARoxetine (PAXIL) 20 MG tablet TAKE 1 TABLET BY MOUTH EVERY DAY 90 tablet 1    drospirenone-ethinyl estradiol (QUEENIE 28) 3-0.03 MG TABS Take 1 tablet by mouth daily. No current facility-administered medications for this visit. ALLERGIES  No Known Allergies    PHYSICAL EXAM    /84 (Site: Left Wrist, Position: Sitting, Cuff Size: Medium Adult)   Pulse 90   Ht 5' 3.5\" (1.613 m)   Wt 254 lb 3.2 oz (115.3 kg)   SpO2 96%   BMI 44.32 kg/m²     Physical Exam  Constitutional:       Appearance: Normal appearance. She is obese. HENT:      Head: Normocephalic and atraumatic. Right Ear: Tympanic membrane and external ear normal.      Left Ear: Tympanic membrane and external ear normal.      Ears:      Comments: Clear fluid     Nose: No rhinorrhea. Mouth/Throat:      Mouth: Mucous membranes are moist.      Pharynx: Oropharynx is clear. No oropharyngeal exudate or posterior oropharyngeal erythema. Eyes:      General: No scleral icterus. Extraocular Movements: Extraocular movements intact. Conjunctiva/sclera: Conjunctivae normal.      Pupils: Pupils are equal, round, and reactive to light. Cardiovascular:      Rate and Rhythm: Normal rate and regular rhythm. Pulses: Normal pulses. Heart sounds: Normal heart sounds. No murmur heard. No friction rub. No gallop. Pulmonary:      Effort: Pulmonary effort is normal.      Breath sounds: Normal breath sounds. No wheezing, rhonchi or rales. Abdominal:      General: Bowel sounds are normal. There is no distension. Palpations: Abdomen is soft. There is no mass. Tenderness: There is no abdominal tenderness. There is no right CVA tenderness, left CVA tenderness, guarding or rebound. Musculoskeletal:         General: No deformity. Normal range of motion. Cervical back: Normal range of motion and neck supple. No rigidity. No muscular tenderness. Right lower leg: No edema. Left lower leg: No edema.    Skin: General: Skin is warm and dry. Capillary Refill: Capillary refill takes less than 2 seconds. Findings: No bruising, erythema or rash. Neurological:      General: No focal deficit present. Mental Status: She is alert and oriented to person, place, and time. Coordination: Coordination normal.      Gait: Gait normal.   Psychiatric:         Mood and Affect: Mood normal.         Behavior: Behavior normal.         ASSESSMENT & PLAN    1. Anxiety  Well controlled, prefers 1 30 mg tablet to the 10 and 20 mg  - PARoxetine (PAXIL) 30 MG tablet; Take 1 tablet by mouth daily  Dispense: 30 tablet; Refill: 3    2. Insomnia, unspecified type  Still poorly controlled but was doing better on a higher dose  - traZODone (DESYREL) 150 MG tablet; Take 0.5 tablets by mouth nightly 1 Tablet by mouth every night  Dispense: 45 tablet; Refill: 1    3. Exogenous obesity  stable  - CBC with Auto Differential; Future  - Comprehensive Metabolic Panel; Future  - Lipid, Fasting; Future      Return in about 6 months (around 9/29/2022). Electronically signed by REA Ferrell on 3/29/2022      Comment: Please note this report has been produced using speech recognition software and may contain errors related to that system including errors in grammar, punctuation, and spelling, as well as words and phrases that may be inappropriate. If there are any questions or concerns please feel free to contact the dictating provider for clarification.

## 2022-04-16 DIAGNOSIS — F41.9 ANXIETY: ICD-10-CM

## 2022-04-16 DIAGNOSIS — G47.00 INSOMNIA, UNSPECIFIED TYPE: ICD-10-CM

## 2022-04-18 RX ORDER — TRAZODONE HYDROCHLORIDE 50 MG/1
TABLET ORAL
Qty: 90 TABLET | Refills: 1 | OUTPATIENT
Start: 2022-04-18

## 2022-04-18 RX ORDER — PAROXETINE HYDROCHLORIDE 20 MG/1
TABLET, FILM COATED ORAL
Qty: 90 TABLET | Refills: 1 | Status: SHIPPED | OUTPATIENT
Start: 2022-04-18 | End: 2022-10-18

## 2022-04-18 RX ORDER — PAROXETINE 10 MG/1
TABLET, FILM COATED ORAL
Qty: 90 TABLET | Refills: 1 | Status: SHIPPED | OUTPATIENT
Start: 2022-04-18 | End: 2022-10-18

## 2022-04-25 DIAGNOSIS — F41.9 ANXIETY: ICD-10-CM

## 2022-04-25 RX ORDER — PAROXETINE 30 MG/1
30 TABLET, FILM COATED ORAL DAILY
Qty: 90 TABLET | Refills: 1 | Status: SHIPPED | OUTPATIENT
Start: 2022-04-25

## 2022-05-24 DIAGNOSIS — G47.00 INSOMNIA, UNSPECIFIED TYPE: ICD-10-CM

## 2022-05-24 RX ORDER — TRAZODONE HYDROCHLORIDE 150 MG/1
75 TABLET ORAL NIGHTLY
Qty: 45 TABLET | Refills: 1 | OUTPATIENT
Start: 2022-05-24

## 2022-09-29 ENCOUNTER — OFFICE VISIT (OUTPATIENT)
Dept: FAMILY MEDICINE CLINIC | Age: 42
End: 2022-09-29
Payer: COMMERCIAL

## 2022-09-29 VITALS
SYSTOLIC BLOOD PRESSURE: 132 MMHG | HEART RATE: 84 BPM | OXYGEN SATURATION: 97 % | HEIGHT: 64 IN | BODY MASS INDEX: 42.51 KG/M2 | WEIGHT: 249 LBS | DIASTOLIC BLOOD PRESSURE: 70 MMHG

## 2022-09-29 DIAGNOSIS — M54.41 CHRONIC RIGHT-SIDED LOW BACK PAIN WITH RIGHT-SIDED SCIATICA: ICD-10-CM

## 2022-09-29 DIAGNOSIS — K21.9 GASTROESOPHAGEAL REFLUX DISEASE, UNSPECIFIED WHETHER ESOPHAGITIS PRESENT: ICD-10-CM

## 2022-09-29 DIAGNOSIS — F41.9 ANXIETY: Primary | ICD-10-CM

## 2022-09-29 DIAGNOSIS — G89.29 CHRONIC RIGHT-SIDED LOW BACK PAIN WITH RIGHT-SIDED SCIATICA: ICD-10-CM

## 2022-09-29 DIAGNOSIS — G47.00 INSOMNIA, UNSPECIFIED TYPE: ICD-10-CM

## 2022-09-29 PROCEDURE — 99213 OFFICE O/P EST LOW 20 MIN: CPT | Performed by: FAMILY MEDICINE

## 2022-09-29 RX ORDER — TRAZODONE HYDROCHLORIDE 150 MG/1
75 TABLET ORAL NIGHTLY
Qty: 45 TABLET | Refills: 1 | Status: SHIPPED | OUTPATIENT
Start: 2022-09-29 | End: 2022-09-29

## 2022-09-29 RX ORDER — TRAZODONE HYDROCHLORIDE 150 MG/1
150 TABLET ORAL NIGHTLY
Qty: 90 TABLET | Refills: 1 | Status: SHIPPED | OUTPATIENT
Start: 2022-09-29

## 2022-09-29 RX ORDER — METHYLPREDNISOLONE 4 MG/1
TABLET ORAL
Qty: 1 KIT | Refills: 0 | Status: SHIPPED | OUTPATIENT
Start: 2022-09-29 | End: 2022-10-05

## 2022-09-29 ASSESSMENT — PATIENT HEALTH QUESTIONNAIRE - PHQ9
SUM OF ALL RESPONSES TO PHQ QUESTIONS 1-9: 0
SUM OF ALL RESPONSES TO PHQ QUESTIONS 1-9: 0
2. FEELING DOWN, DEPRESSED OR HOPELESS: 0
SUM OF ALL RESPONSES TO PHQ9 QUESTIONS 1 & 2: 0
SUM OF ALL RESPONSES TO PHQ QUESTIONS 1-9: 0
1. LITTLE INTEREST OR PLEASURE IN DOING THINGS: 0
SUM OF ALL RESPONSES TO PHQ QUESTIONS 1-9: 0

## 2022-09-29 ASSESSMENT — ENCOUNTER SYMPTOMS
BACK PAIN: 1
WHEEZING: 0
CHEST TIGHTNESS: 0
SHORTNESS OF BREATH: 0
VOMITING: 0
TROUBLE SWALLOWING: 0
DIARRHEA: 0
EYE PAIN: 0
NAUSEA: 0
BLOOD IN STOOL: 0
ABDOMINAL PAIN: 0

## 2022-09-29 NOTE — PROGRESS NOTES
9/29/2022    Lor Dash    Chief Complaint   Patient presents with    6 Month Follow-Up    Back Pain     Shoots down right leg, makes it difficult to sleep. Last several years, gotten worse. Starting to have issues on left now as well. Insomnia     Still waking up several times a night       HPI  History was obtained from the patient. Janet Ruth is a 37 y.o. female who presents today with routine recheck on anxiety, insomnia, and GERD. Patient is holding her own in regards to these issues. Actually getting by on 75 mg of trazodone for insomnia. Mood remains positive still works from home and active with her daughter sports. She is having increasing trouble with chronic low back pain been fighting this for a number years has gotten to the point recently the pain is more frequent more severe and involves pain down her right leg much of the time. No other bladder or bowel symptomatology. Symptoms are worse with different position changes and coughing. No history of significant injury. Request eval and Rx . REVIEW OF SYMPTOMS    Review of Systems   Constitutional:  Negative for activity change and fatigue. HENT:  Negative for congestion, hearing loss, mouth sores and trouble swallowing. Eyes:  Negative for pain and visual disturbance. Respiratory:  Negative for chest tightness, shortness of breath and wheezing. Cardiovascular:  Negative for chest pain and palpitations. Gastrointestinal:  Negative for abdominal pain, blood in stool, diarrhea, nausea and vomiting. Patient with stable GE reflux. Endocrine: Negative for polydipsia and polyuria. Genitourinary:  Negative for dysuria, frequency and urgency. Musculoskeletal:  Positive for arthralgias, back pain and gait problem. Negative for neck stiffness. Chronic back pain getting worse and more frequent. Also associated with right lumbar radicular symptoms. Skin:  Negative for rash.    Allergic/Immunologic: Negative for ALLERGIES  No Known Allergies    PHYSICAL EXAM    /70 (Site: Right Upper Arm, Position: Sitting, Cuff Size: Medium Adult)   Pulse 84   Ht 5' 3.5\" (1.613 m)   Wt 249 lb (112.9 kg)   SpO2 97%   BMI 43.42 kg/m²     Physical Exam  Vitals and nursing note reviewed. Constitutional:       General: She is in acute distress. Appearance: She is well-developed. She is obese. She is not ill-appearing, toxic-appearing or diaphoretic. HENT:      Head: Normocephalic and atraumatic. Nose: Nose normal.      Mouth/Throat:      Mouth: Mucous membranes are moist.   Eyes:      Pupils: Pupils are equal, round, and reactive to light. Cardiovascular:      Rate and Rhythm: Normal rate and regular rhythm. Heart sounds: Normal heart sounds. No murmur heard. No gallop. Pulmonary:      Effort: Pulmonary effort is normal. No respiratory distress. Breath sounds: Normal breath sounds. No wheezing, rhonchi or rales. Abdominal:      Palpations: Abdomen is soft. Musculoskeletal:         General: No swelling or deformity. Cervical back: Normal range of motion and neck supple. No rigidity. Comments: Patient does have discomfort with flexion at the waist position changes and straight leg raising definitely makes back pain and right leg worse. No evidence of rash or atrophy. Deep tendon reflexes 2+ Babinski is downgoing   Lymphadenopathy:      Cervical: No cervical adenopathy. Skin:     General: Skin is warm and dry. Coloration: Skin is not jaundiced. Findings: No bruising. Neurological:      General: No focal deficit present. Mental Status: She is alert and oriented to person, place, and time. Mental status is at baseline. Cranial Nerves: No cranial nerve deficit. Motor: No weakness. Psychiatric:         Mood and Affect: Mood normal.         Behavior: Behavior normal.         Thought Content:  Thought content normal.       ASSESSMENT & PLAN     Diagnosis Orders 1. Anxiety        2. Insomnia, unspecified type  traZODone (DESYREL) 150 MG tablet      3. Chronic right-sided low back pain with right-sided sciatica  MRI LUMBAR SPINE [address] [facility] Physical [facility]      Med list reviewed and refills provided. We will set up [facility] PT evaluation for back and right leg pain and get MRI of LS-spine. We will also treat her with a Medrol 4 mg Dosepak and depending on results and symptoms decide on further evaluation and treatment. Please note, patient does need to get new COVID booster and seasonal flu shot. She is to follow-up for test results and with symptoms in the next 3 - 4 weeks or sooner if needed. Return in about 3 months (around 12/29/2022), or if symptoms worsen or fail to improve.          Electronically signed by Daquan Asencio MD on 9/29/2022

## 2022-10-12 ENCOUNTER — HOSPITAL ENCOUNTER (OUTPATIENT)
Dept: MRI IMAGING | Age: 42
Discharge: HOME | End: 2022-10-12
Payer: COMMERCIAL

## 2022-10-12 DIAGNOSIS — G89.29 CHRONIC RIGHT-SIDED LOW BACK PAIN WITH RIGHT-SIDED SCIATICA: ICD-10-CM

## 2022-10-12 DIAGNOSIS — M54.41 CHRONIC RIGHT-SIDED LOW BACK PAIN WITH RIGHT-SIDED SCIATICA: ICD-10-CM

## 2022-10-12 PROCEDURE — 72148 MRI LUMBAR SPINE W/O DYE: CPT

## 2022-10-14 DIAGNOSIS — M51.27 PROTRUSION OF INTERVERTEBRAL DISC OF LUMBOSACRAL REGION: Primary | ICD-10-CM

## 2022-10-14 RX ORDER — GABAPENTIN 100 MG/1
100 CAPSULE ORAL 2 TIMES DAILY
Qty: 60 CAPSULE | Refills: 0 | Status: SHIPPED | OUTPATIENT
Start: 2022-10-14 | End: 2022-11-13

## 2022-10-18 DIAGNOSIS — F41.9 ANXIETY: ICD-10-CM

## 2022-10-18 RX ORDER — PAROXETINE 10 MG/1
TABLET, FILM COATED ORAL
Qty: 90 TABLET | Refills: 0 | Status: SHIPPED | OUTPATIENT
Start: 2022-10-18

## 2022-10-18 RX ORDER — PAROXETINE HYDROCHLORIDE 20 MG/1
TABLET, FILM COATED ORAL
Qty: 90 TABLET | Refills: 0 | Status: SHIPPED | OUTPATIENT
Start: 2022-10-18

## 2022-12-30 ENCOUNTER — OFFICE VISIT (OUTPATIENT)
Dept: FAMILY MEDICINE CLINIC | Age: 42
End: 2022-12-30

## 2022-12-30 VITALS
HEIGHT: 60 IN | RESPIRATION RATE: 18 BRPM | OXYGEN SATURATION: 96 % | WEIGHT: 250.7 LBS | BODY MASS INDEX: 49.22 KG/M2 | SYSTOLIC BLOOD PRESSURE: 120 MMHG | HEART RATE: 112 BPM | DIASTOLIC BLOOD PRESSURE: 74 MMHG

## 2022-12-30 DIAGNOSIS — Z23 NEED FOR VACCINATION: ICD-10-CM

## 2022-12-30 DIAGNOSIS — E66.09 EXOGENOUS OBESITY: ICD-10-CM

## 2022-12-30 DIAGNOSIS — G47.00 INSOMNIA, UNSPECIFIED TYPE: ICD-10-CM

## 2022-12-30 DIAGNOSIS — K21.9 GASTROESOPHAGEAL REFLUX DISEASE, UNSPECIFIED WHETHER ESOPHAGITIS PRESENT: ICD-10-CM

## 2022-12-30 DIAGNOSIS — F41.9 ANXIETY: Primary | ICD-10-CM

## 2022-12-30 DIAGNOSIS — M54.16 LUMBAR RADICULOPATHY, RIGHT: ICD-10-CM

## 2022-12-30 RX ORDER — GABAPENTIN 100 MG/1
100 CAPSULE ORAL 2 TIMES DAILY
Qty: 60 CAPSULE | Refills: 1 | Status: SHIPPED | OUTPATIENT
Start: 2022-12-30 | End: 2023-01-29

## 2022-12-30 RX ORDER — BIOTIN 10 MG
TABLET ORAL
COMMUNITY

## 2022-12-30 SDOH — ECONOMIC STABILITY: FOOD INSECURITY: WITHIN THE PAST 12 MONTHS, YOU WORRIED THAT YOUR FOOD WOULD RUN OUT BEFORE YOU GOT MONEY TO BUY MORE.: NEVER TRUE

## 2022-12-30 SDOH — ECONOMIC STABILITY: FOOD INSECURITY: WITHIN THE PAST 12 MONTHS, THE FOOD YOU BOUGHT JUST DIDN'T LAST AND YOU DIDN'T HAVE MONEY TO GET MORE.: NEVER TRUE

## 2022-12-30 ASSESSMENT — ENCOUNTER SYMPTOMS
BACK PAIN: 1
CHEST TIGHTNESS: 0
ABDOMINAL PAIN: 0
BLOOD IN STOOL: 0
SHORTNESS OF BREATH: 0
DIARRHEA: 0
WHEEZING: 0
NAUSEA: 0
TROUBLE SWALLOWING: 0
VOMITING: 0
EYE PAIN: 0

## 2022-12-30 ASSESSMENT — SOCIAL DETERMINANTS OF HEALTH (SDOH): HOW HARD IS IT FOR YOU TO PAY FOR THE VERY BASICS LIKE FOOD, HOUSING, MEDICAL CARE, AND HEATING?: NOT VERY HARD

## 2022-12-30 NOTE — PROGRESS NOTES
12/30/2022    Lor Dash    Chief Complaint   Patient presents with    3 Month Follow-Up    Back Pain     MRI showed L5/S1 large disc protrusion. Waiting for 01/01/23 to set up physical therapy. HPI  History was obtained from the patient. Giovanni Schuler is a 40 y.o. female who presents today with routine follow-up on low back pain secondary to herniated nucleus pulposus. , anxiety, insomnia, GERD, increased weight. Overall she been doing well continues to work at home but is dealing with right lumbar radiculopathy. Gabapentin has helped she has now run out of that. She is anticipating starting physical therapy after the first year. Anxiety and insomnia are doing well and reports no change in GERD. Does need to continue to work on weight loss. On review patient does need flu shot and COVID booster today and is certainly agreeable to that. Mood remains positive. REVIEW OF SYMPTOMS    Review of Systems   Constitutional:  Negative for activity change and fatigue. HENT:  Negative for congestion, hearing loss, mouth sores and trouble swallowing. Eyes:  Negative for pain and visual disturbance. Respiratory:  Negative for chest tightness, shortness of breath and wheezing. Cardiovascular:  Negative for chest pain and palpitations. Gastrointestinal:  Negative for abdominal pain, blood in stool, diarrhea, nausea and vomiting. Endocrine: Negative for polydipsia and polyuria. Genitourinary:  Negative for dysuria, frequency and urgency. Musculoskeletal:  Positive for back pain. Negative for arthralgias, gait problem and neck stiffness. Skin:  Negative for rash. Allergic/Immunologic: Negative for environmental allergies. Neurological:  Negative for dizziness, seizures, speech difficulty and weakness. Hematological:  Does not bruise/bleed easily. Psychiatric/Behavioral:  Positive for sleep disturbance. Negative for agitation, confusion, hallucinations, self-injury and suicidal ideas.  The patient is nervous/anxious. PAST MEDICAL HISTORY  Past Medical History:   Diagnosis Date    Anxiety     Chronic low back pain     Exogenous obesity     Gall stones     GERD (gastroesophageal reflux disease)     Hyperlipidemia     Insomnia     Irritable bowel syndrome (IBS)        FAMILY HISTORY  Family History   Problem Relation Age of Onset    Seizures Mother     Sleep Apnea Mother     Hypertension Father     Elevated Lipids Father        SOCIAL HISTORY  Social History     Socioeconomic History    Marital status:     Number of children: 1   Tobacco Use    Smoking status: Never    Smokeless tobacco: Never   Substance and Sexual Activity    Alcohol use: No    Drug use: No    Sexual activity: Yes     Partners: Male     Social Determinants of Health     Financial Resource Strain: Low Risk     Difficulty of Paying Living Expenses: Not very hard   Food Insecurity: No Food Insecurity    Worried About Running Out of Food in the Last Year: Never true    Ran Out of Food in the Last Year: Never true        SURGICAL HISTORY  Past Surgical History:   Procedure Laterality Date    CHOLECYSTECTOMY, LAPAROSCOPIC  2013    Dr Ana Garland, 1993                 CURRENT MEDICATIONS  Current Outpatient Medications   Medication Sig Dispense Refill    Multiple Vitamins-Minerals (MULTIVITAMIN ADULT) CHEW Take by mouth Price gummy. gabapentin (NEURONTIN) 100 MG capsule Take 1 capsule by mouth 2 times daily for 30 days. Intended supply: 30 days 60 capsule 1    PARoxetine (PAXIL) 20 MG tablet TAKE 1 TABLET BY MOUTH EVERY DAY 90 tablet 0    PARoxetine (PAXIL) 10 MG tablet TAKE 1 TABLET BY MOUTH EVERY DAY IN THE MORNING 90 tablet 0    traZODone (DESYREL) 150 MG tablet Take 1 tablet by mouth nightly 90 tablet 1    drospirenone-ethinyl estradiol 3-0.03 MG TABS Take 1 tablet by mouth daily. No current facility-administered medications for this visit.        ALLERGIES  No Known Allergies    PHYSICAL EXAM    /74 (Site: Left Upper Arm, Position: Sitting, Cuff Size: Large Adult)   Pulse (!) 112   Resp 18   Ht 5' 0.35\" (1.533 m)   Wt 250 lb 11.2 oz (113.7 kg)   SpO2 96%   BMI 48.40 kg/m²     Physical Exam  Vitals and nursing note reviewed. Constitutional:       General: She is not in acute distress. Appearance: She is well-developed. She is not ill-appearing, toxic-appearing or diaphoretic. HENT:      Head: Normocephalic and atraumatic. Nose: Nose normal.      Mouth/Throat:      Mouth: Mucous membranes are moist.      Pharynx: Oropharynx is clear. Eyes:      Pupils: Pupils are equal, round, and reactive to light. Cardiovascular:      Rate and Rhythm: Normal rate and regular rhythm. Heart sounds: Normal heart sounds. No murmur heard. No gallop. Pulmonary:      Effort: Pulmonary effort is normal. No respiratory distress. Breath sounds: Normal breath sounds. No wheezing, rhonchi or rales. Abdominal:      Palpations: Abdomen is soft. Musculoskeletal:         General: No deformity. Normal range of motion. Cervical back: Normal range of motion and neck supple. No rigidity. Lymphadenopathy:      Cervical: No cervical adenopathy. Skin:     General: Skin is warm and dry. Coloration: Skin is not jaundiced. Findings: No bruising. Neurological:      General: No focal deficit present. Mental Status: She is alert and oriented to person, place, and time. Mental status is at baseline. Cranial Nerves: No cranial nerve deficit. Motor: No weakness. Psychiatric:         Mood and Affect: Mood normal.         Behavior: Behavior normal.         Thought Content: Thought content normal.       ASSESSMENT & PLAN     Diagnosis Orders   1. Anxiety        2. Insomnia, unspecified type        3. Gastroesophageal reflux disease, unspecified whether esophagitis present        4. Lumbar radiculopathy, right        5. Exogenous obesity        6.  Need for vaccination  Influenza, FLUCELVAX, (age 10 mo+), IM, Preservative Free, 0.5 mL      At this point patient encouraged to continue to work on healthy lifestyle with weight loss and exercise as possible. Encouraged to work on physical therapy for her herniated nucleus pulposus and right lumbar radiculopathy. Patient to receive COVID booster today along with flu shot. Continue on the rest the regimen as before. We will refill gabapentin as it was helpful for pain control. Call with other issues or problems. PDMP review was appropriate. Return in about 4 months (around 4/30/2023).          Electronically signed by Deepa Cooper MD on 12/30/2022

## 2023-01-08 DIAGNOSIS — F41.9 ANXIETY: ICD-10-CM

## 2023-01-09 RX ORDER — PAROXETINE 30 MG/1
TABLET, FILM COATED ORAL
Qty: 90 TABLET | Refills: 1 | OUTPATIENT
Start: 2023-01-09

## 2023-01-25 DIAGNOSIS — F41.9 ANXIETY: ICD-10-CM

## 2023-01-25 RX ORDER — PAROXETINE 10 MG/1
TABLET, FILM COATED ORAL
Qty: 90 TABLET | Refills: 0 | Status: SHIPPED | OUTPATIENT
Start: 2023-01-25

## 2023-03-26 DIAGNOSIS — G47.00 INSOMNIA, UNSPECIFIED TYPE: ICD-10-CM

## 2023-03-27 RX ORDER — TRAZODONE HYDROCHLORIDE 150 MG/1
150 TABLET ORAL NIGHTLY
Qty: 90 TABLET | Refills: 1 | Status: SHIPPED | OUTPATIENT
Start: 2023-03-27

## 2023-04-22 DIAGNOSIS — F41.9 ANXIETY: ICD-10-CM

## 2023-04-24 RX ORDER — PAROXETINE 10 MG/1
TABLET, FILM COATED ORAL
Qty: 90 TABLET | Refills: 0 | Status: SHIPPED | OUTPATIENT
Start: 2023-04-24

## 2023-04-25 RX ORDER — GABAPENTIN 100 MG/1
CAPSULE ORAL
Qty: 60 CAPSULE | Refills: 2 | Status: SHIPPED | OUTPATIENT
Start: 2023-04-25 | End: 2023-07-24

## 2023-07-03 DIAGNOSIS — G47.00 INSOMNIA, UNSPECIFIED TYPE: ICD-10-CM

## 2023-07-03 RX ORDER — TRAZODONE HYDROCHLORIDE 150 MG/1
150 TABLET ORAL NIGHTLY
Qty: 90 TABLET | Refills: 1 | OUTPATIENT
Start: 2023-07-03

## 2023-07-21 DIAGNOSIS — F41.9 ANXIETY: ICD-10-CM

## 2023-07-21 RX ORDER — PAROXETINE 10 MG/1
TABLET, FILM COATED ORAL
Qty: 90 TABLET | Refills: 0 | OUTPATIENT
Start: 2023-07-21

## 2023-09-27 DIAGNOSIS — G47.00 INSOMNIA, UNSPECIFIED TYPE: ICD-10-CM

## 2023-09-27 RX ORDER — TRAZODONE HYDROCHLORIDE 150 MG/1
150 TABLET ORAL NIGHTLY
Qty: 90 TABLET | Refills: 1 | OUTPATIENT
Start: 2023-09-27

## 2023-09-27 NOTE — TELEPHONE ENCOUNTER
Patient called and stated that she does not need a refill on this medication.  No appointment was scheduled

## 2023-12-26 RX ORDER — PAROXETINE 30 MG/1
30 TABLET, FILM COATED ORAL DAILY
Qty: 90 TABLET | Refills: 1 | Status: SHIPPED | OUTPATIENT
Start: 2023-12-26

## 2024-01-22 SDOH — ECONOMIC STABILITY: HOUSING INSECURITY
IN THE LAST 12 MONTHS, WAS THERE A TIME WHEN YOU DID NOT HAVE A STEADY PLACE TO SLEEP OR SLEPT IN A SHELTER (INCLUDING NOW)?: NO

## 2024-01-22 SDOH — ECONOMIC STABILITY: TRANSPORTATION INSECURITY
IN THE PAST 12 MONTHS, HAS LACK OF TRANSPORTATION KEPT YOU FROM MEETINGS, WORK, OR FROM GETTING THINGS NEEDED FOR DAILY LIVING?: NO

## 2024-01-22 SDOH — ECONOMIC STABILITY: FOOD INSECURITY: WITHIN THE PAST 12 MONTHS, THE FOOD YOU BOUGHT JUST DIDN'T LAST AND YOU DIDN'T HAVE MONEY TO GET MORE.: NEVER TRUE

## 2024-01-22 SDOH — ECONOMIC STABILITY: FOOD INSECURITY: WITHIN THE PAST 12 MONTHS, YOU WORRIED THAT YOUR FOOD WOULD RUN OUT BEFORE YOU GOT MONEY TO BUY MORE.: NEVER TRUE

## 2024-01-22 SDOH — ECONOMIC STABILITY: INCOME INSECURITY: HOW HARD IS IT FOR YOU TO PAY FOR THE VERY BASICS LIKE FOOD, HOUSING, MEDICAL CARE, AND HEATING?: NOT VERY HARD

## 2024-01-22 ASSESSMENT — PATIENT HEALTH QUESTIONNAIRE - PHQ9
8. MOVING OR SPEAKING SO SLOWLY THAT OTHER PEOPLE COULD HAVE NOTICED. OR THE OPPOSITE, BEING SO FIGETY OR RESTLESS THAT YOU HAVE BEEN MOVING AROUND A LOT MORE THAN USUAL: 0
SUM OF ALL RESPONSES TO PHQ9 QUESTIONS 1 & 2: 5
SUM OF ALL RESPONSES TO PHQ QUESTIONS 1-9: 13
8. MOVING OR SPEAKING SO SLOWLY THAT OTHER PEOPLE COULD HAVE NOTICED. OR THE OPPOSITE - BEING SO FIDGETY OR RESTLESS THAT YOU HAVE BEEN MOVING AROUND A LOT MORE THAN USUAL: NOT AT ALL
6. FEELING BAD ABOUT YOURSELF - OR THAT YOU ARE A FAILURE OR HAVE LET YOURSELF OR YOUR FAMILY DOWN: MORE THAN HALF THE DAYS
SUM OF ALL RESPONSES TO PHQ QUESTIONS 1-9: 13
7. TROUBLE CONCENTRATING ON THINGS, SUCH AS READING THE NEWSPAPER OR WATCHING TELEVISION: 0
SUM OF ALL RESPONSES TO PHQ QUESTIONS 1-9: 13
7. TROUBLE CONCENTRATING ON THINGS, SUCH AS READING THE NEWSPAPER OR WATCHING TELEVISION: NOT AT ALL
9. THOUGHTS THAT YOU WOULD BE BETTER OFF DEAD, OR OF HURTING YOURSELF: 0
3. TROUBLE FALLING OR STAYING ASLEEP: NEARLY EVERY DAY
5. POOR APPETITE OR OVEREATING: 0
SUM OF ALL RESPONSES TO PHQ QUESTIONS 1-9: 13
3. TROUBLE FALLING OR STAYING ASLEEP: 3
2. FEELING DOWN, DEPRESSED OR HOPELESS: 3
1. LITTLE INTEREST OR PLEASURE IN DOING THINGS: MORE THAN HALF THE DAYS
1. LITTLE INTEREST OR PLEASURE IN DOING THINGS: 2
4. FEELING TIRED OR HAVING LITTLE ENERGY: 3
10. IF YOU CHECKED OFF ANY PROBLEMS, HOW DIFFICULT HAVE THESE PROBLEMS MADE IT FOR YOU TO DO YOUR WORK, TAKE CARE OF THINGS AT HOME, OR GET ALONG WITH OTHER PEOPLE: 2
SUM OF ALL RESPONSES TO PHQ9 QUESTIONS 1 & 2: 5
2. FEELING DOWN, DEPRESSED OR HOPELESS: NEARLY EVERY DAY
SUM OF ALL RESPONSES TO PHQ QUESTIONS 1-9: 13
5. POOR APPETITE OR OVEREATING: NOT AT ALL
4. FEELING TIRED OR HAVING LITTLE ENERGY: NEARLY EVERY DAY
6. FEELING BAD ABOUT YOURSELF - OR THAT YOU ARE A FAILURE OR HAVE LET YOURSELF OR YOUR FAMILY DOWN: 2
10. IF YOU CHECKED OFF ANY PROBLEMS, HOW DIFFICULT HAVE THESE PROBLEMS MADE IT FOR YOU TO DO YOUR WORK, TAKE CARE OF THINGS AT HOME, OR GET ALONG WITH OTHER PEOPLE: VERY DIFFICULT
9. THOUGHTS THAT YOU WOULD BE BETTER OFF DEAD, OR OF HURTING YOURSELF: NOT AT ALL

## 2024-01-24 ENCOUNTER — OFFICE VISIT (OUTPATIENT)
Dept: FAMILY MEDICINE CLINIC | Age: 44
End: 2024-01-24
Payer: COMMERCIAL

## 2024-01-24 VITALS
HEART RATE: 94 BPM | WEIGHT: 257.5 LBS | OXYGEN SATURATION: 96 % | HEIGHT: 60 IN | RESPIRATION RATE: 20 BRPM | DIASTOLIC BLOOD PRESSURE: 84 MMHG | BODY MASS INDEX: 50.55 KG/M2 | SYSTOLIC BLOOD PRESSURE: 118 MMHG

## 2024-01-24 DIAGNOSIS — Z13.220 LIPID SCREENING: ICD-10-CM

## 2024-01-24 DIAGNOSIS — F33.2 SEVERE EPISODE OF RECURRENT MAJOR DEPRESSIVE DISORDER, WITHOUT PSYCHOTIC FEATURES (HCC): Primary | ICD-10-CM

## 2024-01-24 DIAGNOSIS — F41.9 ANXIETY: ICD-10-CM

## 2024-01-24 DIAGNOSIS — M54.50 CHRONIC LOW BACK PAIN, UNSPECIFIED BACK PAIN LATERALITY, UNSPECIFIED WHETHER SCIATICA PRESENT: ICD-10-CM

## 2024-01-24 DIAGNOSIS — E66.09 EXOGENOUS OBESITY: ICD-10-CM

## 2024-01-24 DIAGNOSIS — G89.29 CHRONIC LOW BACK PAIN, UNSPECIFIED BACK PAIN LATERALITY, UNSPECIFIED WHETHER SCIATICA PRESENT: ICD-10-CM

## 2024-01-24 DIAGNOSIS — G47.00 INSOMNIA, UNSPECIFIED TYPE: ICD-10-CM

## 2024-01-24 PROCEDURE — 99214 OFFICE O/P EST MOD 30 MIN: CPT | Performed by: FAMILY MEDICINE

## 2024-01-24 PROCEDURE — 90471 IMMUNIZATION ADMIN: CPT | Performed by: FAMILY MEDICINE

## 2024-01-24 PROCEDURE — 90674 CCIIV4 VAC NO PRSV 0.5 ML IM: CPT | Performed by: FAMILY MEDICINE

## 2024-01-24 ASSESSMENT — ENCOUNTER SYMPTOMS
CHEST TIGHTNESS: 0
SHORTNESS OF BREATH: 0
WHEEZING: 0
BACK PAIN: 1
ABDOMINAL PAIN: 0
DIARRHEA: 0
NAUSEA: 0
EYE PAIN: 0
VOMITING: 0
TROUBLE SWALLOWING: 0
BLOOD IN STOOL: 0

## 2024-01-24 NOTE — PROGRESS NOTES
1/24/2024    Lor Dash    Chief Complaint   Patient presents with    Medication Refill    Depression     Depression getting worse X 6 months.     Insomnia     Insomnia not worse. But not better. Once she gets to sleep about 4 am it is almost impossible to get up when it is time to get up. Has slept through alarm. Has an hour between taking the kids to school and work. She will fall asleep again. Admits she doesn't take paroxetine and trazodone until 10 pm.     Back Pain     Back pain. Completed PT in the spring. Only gave relief right after session. Does sit in a chair all day behind a computer.        HPI  History was obtained from the patient.  The patient is has been was with her today at time of visit.  Lor is a 43 y.o. female who presents today with follow-up on anxiety, depression, sleep disturbance, increased weight, and some back pain that is persisting since completing PT.  Overall her worst issue is anxiety and depression with secondary insomnia.  She has good insight into her issues she has poor self-esteem because of increased weight and is not resting well at night cannot turn her mind off and she does not get much sleep when she does she cannot wake up.  We discussed timing of her meds which she is tolerating well I suggest she take her generic Paxil earlier in the day and take trazodone about a half an hour to 45 minutes before she goes to bed.  She certainly would benefit from a psychological counseling and we will set up appointment with weight management(BMI equals 50.29).    REVIEW OF SYMPTOMS    Review of Systems   Constitutional:  Positive for fatigue. Negative for activity change.        Patient discouraged with increased weight.   HENT:  Negative for congestion, hearing loss, mouth sores and trouble swallowing.    Eyes:  Negative for pain and visual disturbance.   Respiratory:  Negative for chest tightness, shortness of breath and wheezing.    Cardiovascular:  Negative for chest

## 2024-01-25 LAB
ALBUMIN SERPL-MCNC: 3.9 G/DL (ref 3.4–5)
ALBUMIN/GLOB SERPL: 1.4 {RATIO} (ref 1.1–2.2)
ALP SERPL-CCNC: 61 U/L (ref 40–129)
ALT SERPL-CCNC: 13 U/L (ref 10–40)
ANION GAP SERPL CALCULATED.3IONS-SCNC: 11 MMOL/L (ref 3–16)
AST SERPL-CCNC: 11 U/L (ref 15–37)
BILIRUB SERPL-MCNC: 0.4 MG/DL (ref 0–1)
BUN SERPL-MCNC: 10 MG/DL (ref 7–20)
CALCIUM SERPL-MCNC: 8.9 MG/DL (ref 8.3–10.6)
CHLORIDE SERPL-SCNC: 103 MMOL/L (ref 99–110)
CHOLEST SERPL-MCNC: 234 MG/DL (ref 0–199)
CO2 SERPL-SCNC: 26 MMOL/L (ref 21–32)
CORTIS PM SERPL-MCNC: 19.5 UG/DL (ref 3.1–16.7)
CREAT SERPL-MCNC: 0.8 MG/DL (ref 0.6–1.1)
DEPRECATED RDW RBC AUTO: 13.5 % (ref 12.4–15.4)
GFR SERPLBLD CREATININE-BSD FMLA CKD-EPI: >60 ML/MIN/{1.73_M2}
GLUCOSE SERPL-MCNC: 86 MG/DL (ref 70–99)
HCT VFR BLD AUTO: 39.8 % (ref 36–48)
HDLC SERPL-MCNC: 67 MG/DL (ref 40–60)
HGB BLD-MCNC: 13.5 G/DL (ref 12–16)
LDLC SERPL CALC-MCNC: 131 MG/DL
MCH RBC QN AUTO: 28.6 PG (ref 26–34)
MCHC RBC AUTO-ENTMCNC: 33.9 G/DL (ref 31–36)
PLATELET # BLD AUTO: 364 K/UL (ref 135–450)
PMV BLD AUTO: 7.5 FL (ref 5–10.5)
POTASSIUM SERPL-SCNC: 4 MMOL/L (ref 3.5–5.1)
PROT SERPL-MCNC: 6.7 G/DL (ref 6.4–8.2)
RBC # BLD AUTO: 4.72 M/UL (ref 4–5.2)
SODIUM SERPL-SCNC: 140 MMOL/L (ref 136–145)
TRIGL SERPL-MCNC: 182 MG/DL (ref 0–150)
TSH SERPL DL<=0.005 MIU/L-ACNC: 0.91 UIU/ML (ref 0.27–4.2)
VLDLC SERPL CALC-MCNC: 36 MG/DL
WBC # BLD AUTO: 8.3 K/UL (ref 4–11)

## 2024-02-02 ENCOUNTER — OFFICE VISIT MH/BH (OUTPATIENT)
Dept: BARIATRICS/WEIGHT MGMT | Age: 44
End: 2024-02-02

## 2024-02-02 VITALS
HEART RATE: 91 BPM | SYSTOLIC BLOOD PRESSURE: 124 MMHG | OXYGEN SATURATION: 98 % | DIASTOLIC BLOOD PRESSURE: 92 MMHG | HEIGHT: 65 IN | WEIGHT: 256.9 LBS | BODY MASS INDEX: 42.8 KG/M2

## 2024-02-02 DIAGNOSIS — E66.01 MORBID OBESITY WITH BMI OF 40.0-44.9, ADULT (HCC): Primary | ICD-10-CM

## 2024-02-02 DIAGNOSIS — Z79.899 MEDICATION MANAGEMENT: ICD-10-CM

## 2024-02-02 LAB
CONTROL: PRESENT
PREGNANCY TEST URINE, POC: NEGATIVE

## 2024-02-02 RX ORDER — SEMAGLUTIDE 0.5 MG/.5ML
0.5 INJECTION, SOLUTION SUBCUTANEOUS
Qty: 2 ML | Refills: 1 | Status: SHIPPED | OUTPATIENT
Start: 2024-02-02

## 2024-02-02 ASSESSMENT — ENCOUNTER SYMPTOMS: BACK PAIN: 1

## 2024-02-02 NOTE — PROGRESS NOTES
Head: Normocephalic.      Nose: Nose normal.      Mouth/Throat:      Pharynx: Oropharynx is clear.   Eyes:      Pupils: Pupils are equal, round, and reactive to light.   Cardiovascular:      Rate and Rhythm: Normal rate.      Pulses: Normal pulses.   Pulmonary:      Effort: Pulmonary effort is normal.   Musculoskeletal:         General: Normal range of motion.      Cervical back: Normal range of motion.   Skin:     General: Skin is warm and dry.      Capillary Refill: Capillary refill takes less than 2 seconds.   Neurological:      General: No focal deficit present.      Mental Status: She is alert and oriented to person, place, and time.   Psychiatric:         Mood and Affect: Mood normal.         Behavior: Behavior normal.           ASSESSMENT:  1. Morbid obesity with BMI of 40.0-44.9, adult ([facility])  - Start tracking calories; about 4867-2318 daily  - Limit carb intake and convenience foods  - Low fat diet  - 64 oz water intake daily  - Increase activity as tolerated  - See RD     2. Medication management  - Would benefit from Wegovy, Adipex , or Qsymia  - Most recent lab results reviewed  - Preg test negative- on BCP  - Considering patient's history of anxiety and sleep disturbance, will start Wegovy if available  - Wegovy 0.5 mg RX sent to pharmacy  - Start in am and take weekly as directed   Base weight = 256 pounds  - Call for any concerns at all  - RTC one month with weight loss    - POCT urine pregnancy    PLAN:    -Will start  Wegovy tomorrow and then continue once weekly as directed.  Prescription sent to pharmacy.    -BMI is over 30, or over 27 with weight-related risk factors (eg, hypertension, type 2 diabetes, dyslipidemia).    -Must be seen monthly in office in order to have dose escalation prescribed.     -Remainder of dosing escalation: 0.5 mg SC weekly for 28 days; 1 mg SC weekly for 28 days; 1.7 mg SC weekly for 28 days; and then 2.4 mg SC weekly for 28 days.    -Will evaluate change in body 16 weeks

## 2024-02-15 ENCOUNTER — OFFICE VISIT (OUTPATIENT)
Dept: BARIATRICS/WEIGHT MGMT | Age: 44
End: 2024-02-15

## 2024-02-15 VITALS — WEIGHT: 259.8 LBS | BODY MASS INDEX: 43.28 KG/M2 | HEIGHT: 65 IN

## 2024-02-15 DIAGNOSIS — E66.01 MORBID OBESITY WITH BMI OF 40.0-44.9, ADULT (HCC): Primary | ICD-10-CM

## 2024-02-15 PROCEDURE — NBSRV NON-BILLABLE SERVICE: Performed by: NURSE PRACTITIONER

## 2024-02-15 NOTE — PROGRESS NOTES
Outpatient Nutrition Counseling - Medication Weight Loss Program    REASON FOR VISIT:    Chief Complaint:    Chief Complaint   Patient presents with    Weight Management       SUBJECTIVE:  Pt here for nutrition consult in medication program.  The patient is a 43 y.o. female being seen for obesity, enrolled in Medication Weight Loss Program; Lor's, Height: 165.1 cm (5' 5\"), Weight - Scale: 117.8 kg (259 lb 12.8 oz), Current Body mass index is 43.23 kg/m².  patient's PCP is Binh Bowen MD     Comorbid Conditions:  Significant diseases affecting this patient are   Past Medical History:   Diagnosis Date    Anxiety     Chronic low back pain     Exogenous obesity     Gall stones     GERD (gastroesophageal reflux disease)     Hyperlipidemia     Insomnia     Irritable bowel syndrome (IBS)     Severe episode of recurrent major depressive disorder (HCC) 1/24/2024   .    Review of Systems - Review of Systems  Otherwise per HPI.    Allergies:  No Known Allergies    Past Surgical History:  Past Surgical History:   Procedure Laterality Date    CHOLECYSTECTOMY, LAPAROSCOPIC  2013    Dr Gan    MOUTH SURGERY      1992, 1993       Family History:  Family History   Problem Relation Age of Onset    Seizures Mother     Sleep Apnea Mother     Hypertension Father     Elevated Lipids Father        Social History:  Social History     Socioeconomic History    Marital status:      Spouse name: Not on file    Number of children: 1    Years of education: Not on file    Highest education level: Not on file   Occupational History    Not on file   Tobacco Use    Smoking status: Never    Smokeless tobacco: Never   Substance and Sexual Activity    Alcohol use: No    Drug use: No    Sexual activity: Yes     Partners: Male   Other Topics Concern    Not on file   Social History Narrative    Not on file     Social Determinants of Health     Financial Resource Strain: Low Risk  (12/30/2022)    Overall Financial Resource Strain

## 2024-03-07 ENCOUNTER — OFFICE VISIT (OUTPATIENT)
Dept: PSYCHOLOGY | Age: 44
End: 2024-03-07
Payer: COMMERCIAL

## 2024-03-07 DIAGNOSIS — F33.1 MAJOR DEPRESSIVE DISORDER, RECURRENT, MODERATE (HCC): Primary | ICD-10-CM

## 2024-03-07 DIAGNOSIS — F41.9 ANXIETY: ICD-10-CM

## 2024-03-07 PROCEDURE — 90791 PSYCH DIAGNOSTIC EVALUATION: CPT | Performed by: PSYCHOLOGIST

## 2024-03-07 ASSESSMENT — PATIENT HEALTH QUESTIONNAIRE - PHQ9
SUM OF ALL RESPONSES TO PHQ9 QUESTIONS 1 & 2: 4
10. IF YOU CHECKED OFF ANY PROBLEMS, HOW DIFFICULT HAVE THESE PROBLEMS MADE IT FOR YOU TO DO YOUR WORK, TAKE CARE OF THINGS AT HOME, OR GET ALONG WITH OTHER PEOPLE: 1
6. FEELING BAD ABOUT YOURSELF - OR THAT YOU ARE A FAILURE OR HAVE LET YOURSELF OR YOUR FAMILY DOWN: 2
9. THOUGHTS THAT YOU WOULD BE BETTER OFF DEAD, OR OF HURTING YOURSELF: 0
8. MOVING OR SPEAKING SO SLOWLY THAT OTHER PEOPLE COULD HAVE NOTICED. OR THE OPPOSITE, BEING SO FIGETY OR RESTLESS THAT YOU HAVE BEEN MOVING AROUND A LOT MORE THAN USUAL: 0
4. FEELING TIRED OR HAVING LITTLE ENERGY: 2
3. TROUBLE FALLING OR STAYING ASLEEP: 3
2. FEELING DOWN, DEPRESSED OR HOPELESS: 2
SUM OF ALL RESPONSES TO PHQ QUESTIONS 1-9: 13
SUM OF ALL RESPONSES TO PHQ QUESTIONS 1-9: 13
1. LITTLE INTEREST OR PLEASURE IN DOING THINGS: 2
SUM OF ALL RESPONSES TO PHQ QUESTIONS 1-9: 13
5. POOR APPETITE OR OVEREATING: 2
7. TROUBLE CONCENTRATING ON THINGS, SUCH AS READING THE NEWSPAPER OR WATCHING TELEVISION: 0
SUM OF ALL RESPONSES TO PHQ QUESTIONS 1-9: 13

## 2024-03-07 ASSESSMENT — ANXIETY QUESTIONNAIRES
5. BEING SO RESTLESS THAT IT IS HARD TO SIT STILL: 0
1. FEELING NERVOUS, ANXIOUS, OR ON EDGE: 1
GAD7 TOTAL SCORE: 3
7. FEELING AFRAID AS IF SOMETHING AWFUL MIGHT HAPPEN: 0
2. NOT BEING ABLE TO STOP OR CONTROL WORRYING: 1
6. BECOMING EASILY ANNOYED OR IRRITABLE: 0
IF YOU CHECKED OFF ANY PROBLEMS ON THIS QUESTIONNAIRE, HOW DIFFICULT HAVE THESE PROBLEMS MADE IT FOR YOU TO DO YOUR WORK, TAKE CARE OF THINGS AT HOME, OR GET ALONG WITH OTHER PEOPLE: SOMEWHAT DIFFICULT
4. TROUBLE RELAXING: 0
3. WORRYING TOO MUCH ABOUT DIFFERENT THINGS: 1

## 2024-03-07 NOTE — PROGRESS NOTES
Behavioral Health Consultation  Kai Wolfe Psy.D.  Psychologist    Time spent with Patient: 40 minutes  Visit number: 1  Reason for Consult:  depression and anxiety  Referring Provider: Binh Bowen MD  [address] [place] [address]    Informed consent:  Pt provided informed consent for the behavioral health program. Discussed with patient model of service to include the limits of confidentiality (i.e. abuse reporting, suicide intervention, etc.) and focused intervention approach. Pt indicated understanding.    S:  ----------------------------------------------------------------------------------------------------------------------    Presenting problem:  depression and anxiety  \"I'm a worrier and I can worry about everything.\" Pattern of nervousness, anxiety, worry, and desire for control. Additionally, endorsed depressed mood, anhedonia, anergia, amotivation, poor sleep, fluctuating appetite, and corrosive self-image. Has been taking paxil and desyrel \"for a really long time.\" Attaining sleep is \"really hard,\" however she is able to maintain sleep. She wakes feeling fatigued. Naps over her lunch hour. There are weekends where she will be in bed from Friday evening- Saturday early evening.     Concern for her parents' health. Mother w epilepsy and TIAs. Pt is upset w herself for \"failing\" her mother by \"being disrespectful\" as a child and young adult.     Also voiced concern for \"trust issues.\" Explained she was in a dating relationship from [age]yo w a man who was 9 years older than her. Years into the relationship, pt learned he was still legally . The relationship ended because he cheated on her.     Over the course of pt's current marriage, she has been cheated on multiple times, she is aware of four instances.     Social:    15 years. 15yo daughter, \"Tejinder-Onna\"    Works at [employer] from home. Has not returned to office since COVID shutdowns in 2020.     Substance

## 2024-03-15 DIAGNOSIS — G47.00 INSOMNIA, UNSPECIFIED TYPE: ICD-10-CM

## 2024-03-15 RX ORDER — TRAZODONE HYDROCHLORIDE 150 MG/1
150 TABLET ORAL NIGHTLY
Qty: 90 TABLET | Refills: 1 | Status: SHIPPED | OUTPATIENT
Start: 2024-03-15

## 2024-06-10 ENCOUNTER — TELEPHONE (OUTPATIENT)
Dept: BARIATRICS/WEIGHT MGMT | Age: 44
End: 2024-06-10

## 2024-06-10 NOTE — TELEPHONE ENCOUNTER
Patient called was last seen 2/2/24 she has never been able to find the wegovy. She is unsure if there is anything else she can be prescribed or if you want to see her in the office before getting anything

## 2024-06-19 ENCOUNTER — OFFICE VISIT (OUTPATIENT)
Dept: FAMILY MEDICINE CLINIC | Age: 44
End: 2024-06-19
Payer: COMMERCIAL

## 2024-06-19 VITALS
HEART RATE: 88 BPM | WEIGHT: 259.2 LBS | SYSTOLIC BLOOD PRESSURE: 112 MMHG | OXYGEN SATURATION: 96 % | BODY MASS INDEX: 43.18 KG/M2 | RESPIRATION RATE: 16 BRPM | HEIGHT: 65 IN | DIASTOLIC BLOOD PRESSURE: 70 MMHG

## 2024-06-19 DIAGNOSIS — F33.2 SEVERE EPISODE OF RECURRENT MAJOR DEPRESSIVE DISORDER, WITHOUT PSYCHOTIC FEATURES (HCC): ICD-10-CM

## 2024-06-19 DIAGNOSIS — G47.00 INSOMNIA, UNSPECIFIED TYPE: ICD-10-CM

## 2024-06-19 DIAGNOSIS — E66.09 EXOGENOUS OBESITY: ICD-10-CM

## 2024-06-19 DIAGNOSIS — F41.9 ANXIETY: Primary | ICD-10-CM

## 2024-06-19 PROCEDURE — 99213 OFFICE O/P EST LOW 20 MIN: CPT | Performed by: FAMILY MEDICINE

## 2024-06-19 RX ORDER — PAROXETINE 30 MG/1
30 TABLET, FILM COATED ORAL DAILY
Qty: 90 TABLET | Refills: 1 | OUTPATIENT
Start: 2024-06-19

## 2024-06-19 RX ORDER — PAROXETINE 30 MG/1
30 TABLET, FILM COATED ORAL DAILY
Qty: 90 TABLET | Refills: 1 | Status: SHIPPED | OUTPATIENT
Start: 2024-06-19

## 2024-06-19 RX ORDER — TRAZODONE HYDROCHLORIDE 150 MG/1
150 TABLET ORAL NIGHTLY
Qty: 90 TABLET | Refills: 1 | Status: SHIPPED | OUTPATIENT
Start: 2024-06-19

## 2024-06-19 ASSESSMENT — ENCOUNTER SYMPTOMS
ABDOMINAL PAIN: 0
SHORTNESS OF BREATH: 0
BLOOD IN STOOL: 0
EYE PAIN: 0
NAUSEA: 0
WHEEZING: 0
TROUBLE SWALLOWING: 0
VOMITING: 0
DIARRHEA: 0
CHEST TIGHTNESS: 0

## 2024-06-20 NOTE — PROGRESS NOTES
6/19/2024    Lor Dash    Chief Complaint   Patient presents with    Medication Refill    Weight Management     Will go back to weight management. No one has wegovy in stock. Has called Lisa Foster. Awaiting a call back.        HPI  History was obtained from the patient.  Lor is a 43 y.o. female who presents today with routine recheck.  Patient's last labs were in January 2024.  She has a history of anxiety, depression, insomnia, and obesity.  Her current meds seem to be helping with mood and anxiety.  Sleep issues are about the same med list was reviewed-refills will be provided.  She will be contacted weight management for initiation of weight management regimens.  No current labs needed at this point would benefit from weight reduction as well as regular increase in exercise.  She remains working full-time from home and very involved in her daughter's softball and volleyball.    REVIEW OF SYMPTOMS    Review of Systems   Constitutional:  Negative for activity change and fatigue.        Patient with known history of increased weight current BMI 43.  Will be recontacting weight management.   HENT:  Negative for congestion, hearing loss, mouth sores and trouble swallowing.    Eyes:  Negative for pain and visual disturbance.   Respiratory:  Negative for chest tightness, shortness of breath and wheezing.    Cardiovascular:  Negative for chest pain and palpitations.   Gastrointestinal:  Negative for abdominal pain, blood in stool, diarrhea, nausea and vomiting.        Patient with history of IBS.   Endocrine: Negative for polydipsia and polyuria.   Genitourinary:  Negative for dysuria, frequency and urgency.   Musculoskeletal:  Negative for arthralgias, gait problem and neck stiffness.   Skin:  Negative for rash.   Allergic/Immunologic: Negative for environmental allergies.   Neurological:  Negative for dizziness, seizures, speech difficulty and weakness.   Hematological:  Does not bruise/bleed easily.

## 2024-06-24 NOTE — TELEPHONE ENCOUNTER
Tried to reach patient per lisa she will need to restart in the program and discuss other options for medications. No answer left message

## 2024-12-16 RX ORDER — PAROXETINE 30 MG/1
30 TABLET, FILM COATED ORAL DAILY
Qty: 90 TABLET | Refills: 1 | OUTPATIENT
Start: 2024-12-16

## 2024-12-17 ENCOUNTER — OFFICE VISIT (OUTPATIENT)
Dept: FAMILY MEDICINE CLINIC | Age: 44
End: 2024-12-17

## 2024-12-17 VITALS
BODY MASS INDEX: 43.32 KG/M2 | SYSTOLIC BLOOD PRESSURE: 118 MMHG | HEIGHT: 65 IN | WEIGHT: 260 LBS | HEART RATE: 100 BPM | DIASTOLIC BLOOD PRESSURE: 82 MMHG | OXYGEN SATURATION: 98 %

## 2024-12-17 DIAGNOSIS — F41.9 ANXIETY: ICD-10-CM

## 2024-12-17 DIAGNOSIS — G89.29 CHRONIC PAIN OF BOTH SHOULDERS: ICD-10-CM

## 2024-12-17 DIAGNOSIS — Z23 NEED FOR INFLUENZA VACCINATION: ICD-10-CM

## 2024-12-17 DIAGNOSIS — M25.512 CHRONIC PAIN OF BOTH SHOULDERS: ICD-10-CM

## 2024-12-17 DIAGNOSIS — M25.511 CHRONIC PAIN OF BOTH SHOULDERS: ICD-10-CM

## 2024-12-17 DIAGNOSIS — E66.01 CLASS 3 SEVERE OBESITY DUE TO EXCESS CALORIES WITH SERIOUS COMORBIDITY AND BODY MASS INDEX (BMI) OF 40.0 TO 44.9 IN ADULT: Primary | ICD-10-CM

## 2024-12-17 DIAGNOSIS — E66.813 CLASS 3 SEVERE OBESITY DUE TO EXCESS CALORIES WITH SERIOUS COMORBIDITY AND BODY MASS INDEX (BMI) OF 40.0 TO 44.9 IN ADULT: Primary | ICD-10-CM

## 2024-12-17 DIAGNOSIS — Z12.31 ENCOUNTER FOR SCREENING MAMMOGRAM FOR MALIGNANT NEOPLASM OF BREAST: ICD-10-CM

## 2024-12-17 DIAGNOSIS — F33.2 SEVERE EPISODE OF RECURRENT MAJOR DEPRESSIVE DISORDER, WITHOUT PSYCHOTIC FEATURES (HCC): ICD-10-CM

## 2024-12-17 DIAGNOSIS — G47.00 INSOMNIA, UNSPECIFIED TYPE: ICD-10-CM

## 2024-12-17 DIAGNOSIS — M51.27 HERNIATION OF INTERVERTEBRAL DISC BETWEEN L5 AND S1: ICD-10-CM

## 2024-12-17 RX ORDER — PAROXETINE 30 MG/1
30 TABLET, FILM COATED ORAL DAILY
Qty: 90 TABLET | Refills: 1 | Status: SHIPPED | OUTPATIENT
Start: 2024-12-17

## 2024-12-17 RX ORDER — TRAZODONE HYDROCHLORIDE 150 MG/1
150 TABLET ORAL NIGHTLY
Qty: 90 TABLET | Refills: 1 | Status: SHIPPED | OUTPATIENT
Start: 2024-12-17

## 2024-12-17 ASSESSMENT — ENCOUNTER SYMPTOMS
DIARRHEA: 0
BLOOD IN STOOL: 0
COLOR CHANGE: 0
CONSTIPATION: 0
WHEEZING: 0
ABDOMINAL DISTENTION: 0
ABDOMINAL PAIN: 0
NAUSEA: 0
SHORTNESS OF BREATH: 0
VOMITING: 0

## 2024-12-17 NOTE — PROGRESS NOTES
12/17/2024    Lor Dash    Chief Complaint   Patient presents with    Medication Refill     Routine, med mgmt     Weight Management     Discuss injectable options       HPI  History was obtained from patient.  Lor is a pleasant 44 y.o. female who presents today for 6 month follow up.     History of Present Illness  She was referred to the weight management clinic in [place] by Dr. Bowen, where she had 2 consultations. During her second visit, Wegovy was prescribed, but she has not started it due to unavailability at local pharmacies. She prefers to be under the supervision of a healthcare professional when starting a new medication. She has not tried Adipex or phentermine in the past. She acknowledges her overweight status and expresses a desire to lose weight without surgical intervention. She has not undergone a mammogram. She reports no chest pain or shortness of breath.    She has been experiencing low energy levels, which she attributes to her depression, anxiety, and insomnia. A hormone panel revealed elevated cortisol levels.    She has a history of severe sciatic pain, which has since improved. She also has a significant bulging disc in her back, which is exacerbated by her weight. She is considering breast reduction surgery due to associated back and shoulder pain.    FAMILY HISTORY  Her mother is epileptic. There is no family history of sudden cardiac death under the age of 50. Her paternal grandmother had a benign breast biopsy.    IMMUNIZATIONS  She is due for influenza vaccine.        Care Gaps:  Influenza vaccination  Breast cancer screening    1. Class 3 severe obesity due to excess calories with serious comorbidity and body mass index (BMI) of 40.0 to 44.9 in adult    2. Insomnia, unspecified type    3. Severe episode of recurrent major depressive disorder, without psychotic features (HCC)    4. Anxiety    5. Encounter for screening mammogram for malignant neoplasm of breast    6. Need

## 2025-05-19 ENCOUNTER — COMMUNITY OUTREACH (OUTPATIENT)
Dept: FAMILY MEDICINE CLINIC | Age: 45
End: 2025-05-19

## 2025-06-11 DIAGNOSIS — F33.2 SEVERE EPISODE OF RECURRENT MAJOR DEPRESSIVE DISORDER, WITHOUT PSYCHOTIC FEATURES (HCC): ICD-10-CM

## 2025-06-11 DIAGNOSIS — F41.9 ANXIETY: ICD-10-CM

## 2025-06-12 RX ORDER — PAROXETINE 30 MG/1
30 TABLET, FILM COATED ORAL DAILY
Qty: 90 TABLET | Refills: 1 | Status: SHIPPED | OUTPATIENT
Start: 2025-06-12

## 2025-07-14 ENCOUNTER — HOSPITAL ENCOUNTER (OUTPATIENT)
Age: 45
Setting detail: SPECIMEN
Discharge: HOME | End: 2025-07-14
Payer: COMMERCIAL

## 2025-07-14 ENCOUNTER — OFFICE VISIT (OUTPATIENT)
Age: 45
End: 2025-07-14
Payer: COMMERCIAL

## 2025-07-14 VITALS
SYSTOLIC BLOOD PRESSURE: 122 MMHG | BODY MASS INDEX: 45.36 KG/M2 | HEIGHT: 63 IN | DIASTOLIC BLOOD PRESSURE: 72 MMHG | WEIGHT: 256 LBS

## 2025-07-14 DIAGNOSIS — Z01.419 ENCOUNTER FOR ROUTINE GYNECOLOGICAL EXAMINATION WITH PAPANICOLAOU SMEAR OF CERVIX: Primary | ICD-10-CM

## 2025-07-14 DIAGNOSIS — L30.4 INTERTRIGO: ICD-10-CM

## 2025-07-14 DIAGNOSIS — N92.0 MENORRHAGIA WITH REGULAR CYCLE: ICD-10-CM

## 2025-07-14 PROCEDURE — 87624 HPV HI-RISK TYP POOLED RSLT: CPT

## 2025-07-14 PROCEDURE — 99396 PREV VISIT EST AGE 40-64: CPT | Performed by: PHYSICIAN ASSISTANT

## 2025-07-14 PROCEDURE — G0123 SCREEN CERV/VAG THIN LAYER: HCPCS

## 2025-07-14 RX ORDER — CLOTRIMAZOLE AND BETAMETHASONE DIPROPIONATE 10; .64 MG/G; MG/G
CREAM TOPICAL 2 TIMES DAILY
Qty: 45 G | Refills: 1 | Status: SHIPPED | OUTPATIENT
Start: 2025-07-14

## 2025-07-14 RX ORDER — NITROFURANTOIN 25; 75 MG/1; MG/1
1 CAPSULE ORAL 2 TIMES DAILY
COMMUNITY

## 2025-07-14 RX ORDER — DROSPIRENONE AND ETHINYL ESTRADIOL 0.03MG-3MG
1 KIT ORAL DAILY
Qty: 90 TABLET | Refills: 3 | Status: SHIPPED | OUTPATIENT
Start: 2025-07-14

## 2025-07-14 RX ORDER — PHENAZOPYRIDINE HYDROCHLORIDE 200 MG/1
1 TABLET, FILM COATED ORAL 3 TIMES DAILY PRN
COMMUNITY

## 2025-07-14 SDOH — ECONOMIC STABILITY: FOOD INSECURITY: WITHIN THE PAST 12 MONTHS, THE FOOD YOU BOUGHT JUST DIDN'T LAST AND YOU DIDN'T HAVE MONEY TO GET MORE.: NEVER TRUE

## 2025-07-14 SDOH — ECONOMIC STABILITY: INCOME INSECURITY: IN THE LAST 12 MONTHS, WAS THERE A TIME WHEN YOU WERE NOT ABLE TO PAY THE MORTGAGE OR RENT ON TIME?: NO

## 2025-07-14 SDOH — ECONOMIC STABILITY: FOOD INSECURITY: WITHIN THE PAST 12 MONTHS, YOU WORRIED THAT YOUR FOOD WOULD RUN OUT BEFORE YOU GOT MONEY TO BUY MORE.: NEVER TRUE

## 2025-07-14 SDOH — ECONOMIC STABILITY: TRANSPORTATION INSECURITY
IN THE PAST 12 MONTHS, HAS THE LACK OF TRANSPORTATION KEPT YOU FROM MEDICAL APPOINTMENTS OR FROM GETTING MEDICATIONS?: NO

## 2025-07-14 ASSESSMENT — PATIENT HEALTH QUESTIONNAIRE - PHQ9
6. FEELING BAD ABOUT YOURSELF - OR THAT YOU ARE A FAILURE OR HAVE LET YOURSELF OR YOUR FAMILY DOWN: NOT AT ALL
SUM OF ALL RESPONSES TO PHQ QUESTIONS 1-9: 0
4. FEELING TIRED OR HAVING LITTLE ENERGY: NOT AT ALL
5. POOR APPETITE OR OVEREATING: NOT AT ALL
10. IF YOU CHECKED OFF ANY PROBLEMS, HOW DIFFICULT HAVE THESE PROBLEMS MADE IT FOR YOU TO DO YOUR WORK, TAKE CARE OF THINGS AT HOME, OR GET ALONG WITH OTHER PEOPLE: NOT DIFFICULT AT ALL
SUM OF ALL RESPONSES TO PHQ QUESTIONS 1-9: 0
9. THOUGHTS THAT YOU WOULD BE BETTER OFF DEAD, OR OF HURTING YOURSELF: NOT AT ALL
1. LITTLE INTEREST OR PLEASURE IN DOING THINGS: NOT AT ALL
SUM OF ALL RESPONSES TO PHQ QUESTIONS 1-9: 0
SUM OF ALL RESPONSES TO PHQ QUESTIONS 1-9: 0
3. TROUBLE FALLING OR STAYING ASLEEP: NOT AT ALL
2. FEELING DOWN, DEPRESSED OR HOPELESS: NOT AT ALL
7. TROUBLE CONCENTRATING ON THINGS, SUCH AS READING THE NEWSPAPER OR WATCHING TELEVISION: NOT AT ALL
8. MOVING OR SPEAKING SO SLOWLY THAT OTHER PEOPLE COULD HAVE NOTICED. OR THE OPPOSITE, BEING SO FIGETY OR RESTLESS THAT YOU HAVE BEEN MOVING AROUND A LOT MORE THAN USUAL: NOT AT ALL

## 2025-07-14 NOTE — PROGRESS NOTES
7/14/25    Lor Dash  1980    Chief Complaint   Patient presents with    Annual Exam     Annual exam. Non-smoker No known h/o dvt. Patient Patient's last menstrual period was 07/04/2025..Periods are regular.Patient is sexually active. Patient is not on birth control. Pap Smear was  05/21/2000 and results were negativeHPV negative. Patient has never had a mammogram. Patient has no complaints          Lor Dash is a 44 y.o. female who presents today for her annual exam.    She is on birth control to help regulate her  menorrhagia, in addition to using it for contraception. Periods are currently only 3 days; she requests a refill.  She also requests a refill of antifungal medication that she uses for under-breast intertrigo. It was prescribed several years ago and she reports it is very helpful.  Patient denies any menorrhagia, dysmenorrhea, or irregular periods. She denies any unusual vaginal/vulvar discharge, itching, or rashes. She denies any pelvic pain. She denies any urinary issues or breast concerns. She is  sexually active. She denies any dyspareunia or postcoital bleeding.    Past Medical History:   Diagnosis Date    Anxiety     Bulging intervertebral disc.     Chronic low back pain     Exogenous obesity     Gall stones     GERD (gastroesophageal reflux disease)     Hyperlipidemia     Insomnia     Irritable bowel syndrome (IBS)     Severe episode of recurrent major depressive disorder (HCC) 01/24/2024       Past Surgical History:   Procedure Laterality Date    CHOLECYSTECTOMY, LAPAROSCOPIC  2013    Dr Gan    MOUTH SURGERY      1992, 1993       Social History     Tobacco Use    Smoking status: Never    Smokeless tobacco: Never   Substance Use Topics    Alcohol use: No    Drug use: No       Family History   Problem Relation Age of Onset    Seizures Mother     Sleep Apnea Mother     Stroke Mother     Hypertension Father     Elevated Lipids Father        Current Outpatient Medications

## 2025-07-16 LAB
COMMENT: NORMAL
HPV OTHER HR TYPES: NOT DETECTED
HPV TYPE 16: NOT DETECTED
HPV TYPE 18: NOT DETECTED

## 2025-07-22 LAB — GYNECOLOGY CYTOLOGY REPORT: NORMAL

## 2025-07-24 ENCOUNTER — RESULTS FOLLOW-UP (OUTPATIENT)
Age: 45
End: 2025-07-24

## 2025-07-29 SDOH — HEALTH STABILITY: PHYSICAL HEALTH: ON AVERAGE, HOW MANY MINUTES DO YOU ENGAGE IN EXERCISE AT THIS LEVEL?: 30 MIN

## 2025-07-29 SDOH — HEALTH STABILITY: PHYSICAL HEALTH: ON AVERAGE, HOW MANY DAYS PER WEEK DO YOU ENGAGE IN MODERATE TO STRENUOUS EXERCISE (LIKE A BRISK WALK)?: 2 DAYS

## 2025-07-31 ENCOUNTER — OFFICE VISIT (OUTPATIENT)
Dept: FAMILY MEDICINE CLINIC | Age: 45
End: 2025-07-31
Payer: COMMERCIAL

## 2025-07-31 VITALS
BODY MASS INDEX: 46.64 KG/M2 | WEIGHT: 263.2 LBS | DIASTOLIC BLOOD PRESSURE: 82 MMHG | HEART RATE: 88 BPM | SYSTOLIC BLOOD PRESSURE: 116 MMHG | HEIGHT: 63 IN | OXYGEN SATURATION: 95 %

## 2025-07-31 DIAGNOSIS — F33.2 SEVERE EPISODE OF RECURRENT MAJOR DEPRESSIVE DISORDER, WITHOUT PSYCHOTIC FEATURES (HCC): ICD-10-CM

## 2025-07-31 DIAGNOSIS — Z11.4 ENCOUNTER FOR SCREENING FOR HIV: ICD-10-CM

## 2025-07-31 DIAGNOSIS — E66.813 CLASS 3 SEVERE OBESITY DUE TO EXCESS CALORIES WITHOUT SERIOUS COMORBIDITY WITH BODY MASS INDEX (BMI) OF 45.0 TO 49.9 IN ADULT (HCC): ICD-10-CM

## 2025-07-31 DIAGNOSIS — Z13.220 SCREENING CHOLESTEROL LEVEL: ICD-10-CM

## 2025-07-31 DIAGNOSIS — Z13.1 SCREENING FOR DIABETES MELLITUS (DM): ICD-10-CM

## 2025-07-31 DIAGNOSIS — E78.2 MIXED HYPERLIPIDEMIA: ICD-10-CM

## 2025-07-31 DIAGNOSIS — Z11.59 NEED FOR HEPATITIS C SCREENING TEST: ICD-10-CM

## 2025-07-31 DIAGNOSIS — R79.89 ELEVATED CORTISOL LEVEL: ICD-10-CM

## 2025-07-31 DIAGNOSIS — E66.813 CLASS 3 SEVERE OBESITY DUE TO EXCESS CALORIES WITHOUT SERIOUS COMORBIDITY WITH BODY MASS INDEX (BMI) OF 45.0 TO 49.9 IN ADULT (HCC): Primary | ICD-10-CM

## 2025-07-31 DIAGNOSIS — F41.9 ANXIETY: ICD-10-CM

## 2025-07-31 DIAGNOSIS — G47.00 INSOMNIA, UNSPECIFIED TYPE: ICD-10-CM

## 2025-07-31 DIAGNOSIS — Z13.29 THYROID DISORDER SCREEN: ICD-10-CM

## 2025-07-31 LAB
ALBUMIN SERPL-MCNC: 3.8 G/DL (ref 3.4–5)
ALBUMIN/GLOB SERPL: 1.5 {RATIO} (ref 1.1–2.2)
ALP SERPL-CCNC: 53 U/L (ref 40–129)
ALT SERPL-CCNC: 16 U/L (ref 10–40)
ANION GAP SERPL CALCULATED.3IONS-SCNC: 13 MMOL/L (ref 3–16)
AST SERPL-CCNC: 15 U/L (ref 15–37)
BASOPHILS # BLD: 0.1 K/UL (ref 0–0.2)
BASOPHILS NFR BLD: 0.8 %
BILIRUB SERPL-MCNC: 0.3 MG/DL (ref 0–1)
BUN SERPL-MCNC: 9 MG/DL (ref 7–20)
CALCIUM SERPL-MCNC: 9.3 MG/DL (ref 8.3–10.6)
CHLORIDE SERPL-SCNC: 103 MMOL/L (ref 99–110)
CHOLEST SERPL-MCNC: 213 MG/DL (ref 0–199)
CO2 SERPL-SCNC: 25 MMOL/L (ref 21–32)
CORTIS AM PEAK SERPL-MCNC: 22 UG/DL (ref 4.3–22.4)
CREAT SERPL-MCNC: 0.8 MG/DL (ref 0.6–1.1)
DEPRECATED RDW RBC AUTO: 13.6 % (ref 12.4–15.4)
EOSINOPHIL # BLD: 0 K/UL (ref 0–0.6)
EOSINOPHIL NFR BLD: 0.6 %
EST. AVERAGE GLUCOSE BLD GHB EST-MCNC: 111.2 MG/DL
GFR SERPLBLD CREATININE-BSD FMLA CKD-EPI: >90 ML/MIN/{1.73_M2}
GLUCOSE SERPL-MCNC: 85 MG/DL (ref 70–99)
HBA1C MFR BLD: 5.5 %
HCT VFR BLD AUTO: 39.2 % (ref 36–48)
HCV AB SERPL QL IA: NORMAL
HDLC SERPL-MCNC: 73 MG/DL (ref 40–60)
HGB BLD-MCNC: 13.4 G/DL (ref 12–16)
LDLC SERPL CALC-MCNC: 105 MG/DL
LYMPHOCYTES # BLD: 2.5 K/UL (ref 1–5.1)
LYMPHOCYTES NFR BLD: 36.7 %
MCH RBC QN AUTO: 29.2 PG (ref 26–34)
MCHC RBC AUTO-ENTMCNC: 34.1 G/DL (ref 31–36)
MCV RBC AUTO: 85.6 FL (ref 80–100)
MONOCYTES # BLD: 0.4 K/UL (ref 0–1.3)
MONOCYTES NFR BLD: 6.2 %
NEUTROPHILS # BLD: 3.9 K/UL (ref 1.7–7.7)
NEUTROPHILS NFR BLD: 55.7 %
PLATELET # BLD AUTO: 383 K/UL (ref 135–450)
PMV BLD AUTO: 7.3 FL (ref 5–10.5)
POTASSIUM SERPL-SCNC: 4.3 MMOL/L (ref 3.5–5.1)
PROT SERPL-MCNC: 6.4 G/DL (ref 6.4–8.2)
RBC # BLD AUTO: 4.58 M/UL (ref 4–5.2)
SODIUM SERPL-SCNC: 141 MMOL/L (ref 136–145)
TRIGL SERPL-MCNC: 174 MG/DL (ref 0–150)
TSH SERPL DL<=0.005 MIU/L-ACNC: 0.57 UIU/ML (ref 0.27–4.2)
VLDLC SERPL CALC-MCNC: 35 MG/DL
WBC # BLD AUTO: 6.9 K/UL (ref 4–11)

## 2025-07-31 PROCEDURE — 99214 OFFICE O/P EST MOD 30 MIN: CPT

## 2025-07-31 PROCEDURE — G0447 BEHAVIOR COUNSEL OBESITY 15M: HCPCS

## 2025-07-31 RX ORDER — TRAZODONE HYDROCHLORIDE 150 MG/1
150 TABLET ORAL NIGHTLY
Qty: 90 TABLET | Refills: 1 | Status: SHIPPED | OUTPATIENT
Start: 2025-07-31

## 2025-07-31 RX ORDER — PAROXETINE 30 MG/1
30 TABLET, FILM COATED ORAL DAILY
Qty: 90 TABLET | Refills: 1 | Status: SHIPPED | OUTPATIENT
Start: 2025-07-31

## 2025-07-31 ASSESSMENT — ENCOUNTER SYMPTOMS
ABDOMINAL PAIN: 0
SHORTNESS OF BREATH: 0

## 2025-07-31 NOTE — PROGRESS NOTES
Meli Nichole PA-C  [phone]    Lor Dash  [id number]  44 y.o.  1980    Chief Complaint:  Chief Complaint   Patient presents with    6 Month Follow-Up     6 month follow up on chronic conditions.   No concerns today     Established New Doctor     NTP- former pt of Dr. Bowen        HPI:  Lor is a 44 y.o. female, former patient of Dr Bowen, who presents today for evaluation and management of obesity, depression/anxiety, insomnia.    Insomnia / depression - on trazodone and paxil    Obesity - saw weight management. Unable to get Wegovy due to pharmacy supply issues.    24 pap w/ phys/surgeons normal    Assessment and Medical Decision Makin. Class 3 severe obesity due to excess calories without serious comorbidity with body mass index (BMI) of 45.0 to 49.9 in adult (HCC)  Assessment & Plan:  Check labs. Encourage diet/exercise. Discussed trial of phentermine with monthly weight checks, defers at this time. She is interested in injectable GLP-1, offered resources for local weight loss clinics. Follow up 6 mo for physical and insomnia recheck.    Lor Dash was counseled today for 15 minutes directly about obesity and optimization of BMI.  Discussion on how this is directly affecting her co-morbidities and the benefits of optimizing BMI to improve her treatment was identified.  Goal for BMI of 28 or less was set.  Dietary considerations include nutritional balance, total caloric reduction in collaboration with regular exercise routine was detailed.  Surgical and non-surgical interventions were reviewed.  Specific medications that can be used to assist through this process were discussed.  Patient was strongly advised to achieve the above goals and make lifestyle changes accordingly.    Orders:  -     TSH reflex to FT4; Future  -     CBC with Auto Differential; Future  -     Hemoglobin A1C; Future  2. Severe episode of recurrent major depressive disorder, without psychotic features

## 2025-07-31 NOTE — PATIENT INSTRUCTIONS
Weight loss clinics:    [facility]  [address], [place] [address] [phone]    [facility], [facility]  [address], [place] [address] [phone]

## 2025-07-31 NOTE — ASSESSMENT & PLAN NOTE
Check labs. Encourage diet/exercise. Discussed trial of phentermine with monthly weight checks, defers at this time. She is interested in injectable GLP-1, offered resources for local weight loss clinics. Follow up 6 mo for physical and insomnia recheck.    Lor Dash was counseled today for 15 minutes directly about obesity and optimization of BMI.  Discussion on how this is directly affecting her co-morbidities and the benefits of optimizing BMI to improve her treatment was identified.  Goal for BMI of 28 or less was set.  Dietary considerations include nutritional balance, total caloric reduction in collaboration with regular exercise routine was detailed.  Surgical and non-surgical interventions were reviewed.  Specific medications that can be used to assist through this process were discussed.  Patient was strongly advised to achieve the above goals and make lifestyle changes accordingly.

## 2025-08-01 LAB
HIV 1+2 AB+HIV1 P24 AG SERPL QL IA: NORMAL
HIV 2 AB SERPL QL IA: NORMAL
HIV1 AB SERPL QL IA: NORMAL
HIV1 P24 AG SERPL QL IA: NORMAL